# Patient Record
Sex: FEMALE | Race: WHITE | NOT HISPANIC OR LATINO | Employment: FULL TIME | URBAN - METROPOLITAN AREA
[De-identification: names, ages, dates, MRNs, and addresses within clinical notes are randomized per-mention and may not be internally consistent; named-entity substitution may affect disease eponyms.]

---

## 2023-05-01 PROBLEM — E66.9 OBESITY (BMI 30-39.9): Status: ACTIVE | Noted: 2023-05-01

## 2023-05-17 LAB — EXTERNAL GLUCOSE TOLERANCE TEST FASTING: 114 MG/DL

## 2023-07-24 LAB — EXTERNAL GROUP B STREP ANTIGEN: POSITIVE

## 2023-08-14 RX ORDER — NALOXONE HCL 0.4 MG/ML
0.4 VIAL (ML) INJECTION
Status: CANCELLED | OUTPATIENT
Start: 2023-08-14

## 2023-08-14 RX ORDER — MAGNESIUM CARB/ALUMINUM HYDROX 105-160MG
30 TABLET,CHEWABLE ORAL ONCE
Status: CANCELLED | OUTPATIENT
Start: 2023-08-15 | End: 2023-08-15

## 2023-08-14 RX ORDER — ONDANSETRON 4 MG/1
4 TABLET, FILM COATED ORAL EVERY 6 HOURS PRN
Status: CANCELLED | OUTPATIENT
Start: 2023-08-14

## 2023-08-14 RX ORDER — OXYTOCIN 10 [USP'U]/ML
10 INJECTION, SOLUTION INTRAMUSCULAR; INTRAVENOUS ONCE
Status: CANCELLED | OUTPATIENT
Start: 2023-08-15 | End: 2023-08-15

## 2023-08-14 RX ORDER — OXYCODONE HYDROCHLORIDE AND ACETAMINOPHEN 5; 325 MG/1; MG/1
1 TABLET ORAL EVERY 4 HOURS PRN
Status: CANCELLED | OUTPATIENT
Start: 2023-08-14 | End: 2023-08-24

## 2023-08-14 RX ORDER — MORPHINE SULFATE 2 MG/ML
1 INJECTION, SOLUTION INTRAMUSCULAR; INTRAVENOUS EVERY 4 HOURS PRN
Status: CANCELLED | OUTPATIENT
Start: 2023-08-14 | End: 2023-08-21

## 2023-08-14 RX ORDER — ONDANSETRON 2 MG/ML
4 INJECTION INTRAMUSCULAR; INTRAVENOUS EVERY 6 HOURS PRN
Status: CANCELLED | OUTPATIENT
Start: 2023-08-14

## 2023-08-14 RX ORDER — PENICILLIN G 3000000 [IU]/50ML
3 INJECTION, SOLUTION INTRAVENOUS EVERY 4 HOURS
Status: CANCELLED | OUTPATIENT
Start: 2023-08-15 | End: 2023-08-17

## 2023-08-14 RX ORDER — MISOPROSTOL 100 MCG
25 TABLET ORAL ONCE
Status: CANCELLED | OUTPATIENT
Start: 2023-08-15 | End: 2023-08-15

## 2023-08-14 RX ORDER — IBUPROFEN 600 MG/1
600 TABLET ORAL EVERY 6 HOURS PRN
Status: CANCELLED | OUTPATIENT
Start: 2023-08-14

## 2023-08-14 RX ORDER — CARBOPROST TROMETHAMINE 250 UG/ML
250 INJECTION, SOLUTION INTRAMUSCULAR AS NEEDED
Status: CANCELLED | OUTPATIENT
Start: 2023-08-14

## 2023-08-14 RX ORDER — METHYLERGONOVINE MALEATE 0.2 MG/ML
200 INJECTION INTRAVENOUS ONCE AS NEEDED
Status: CANCELLED | OUTPATIENT
Start: 2023-08-14

## 2023-08-14 RX ORDER — MORPHINE SULFATE 2 MG/ML
2 INJECTION, SOLUTION INTRAMUSCULAR; INTRAVENOUS EVERY 4 HOURS PRN
Status: CANCELLED | OUTPATIENT
Start: 2023-08-14 | End: 2023-08-21

## 2023-08-14 RX ORDER — ACETAMINOPHEN 325 MG/1
650 TABLET ORAL EVERY 4 HOURS PRN
Status: CANCELLED | OUTPATIENT
Start: 2023-08-14

## 2023-08-14 RX ORDER — OXYCODONE AND ACETAMINOPHEN 10; 325 MG/1; MG/1
2 TABLET ORAL EVERY 4 HOURS PRN
Status: CANCELLED | OUTPATIENT
Start: 2023-08-14 | End: 2023-08-24

## 2023-08-14 RX ORDER — TERBUTALINE SULFATE 1 MG/ML
0.25 INJECTION, SOLUTION SUBCUTANEOUS AS NEEDED
Status: CANCELLED | OUTPATIENT
Start: 2023-08-14

## 2023-08-14 RX ORDER — MISOPROSTOL 200 UG/1
800 TABLET ORAL AS NEEDED
Status: CANCELLED | OUTPATIENT
Start: 2023-08-14

## 2023-08-14 RX ORDER — SODIUM CHLORIDE, SODIUM LACTATE, POTASSIUM CHLORIDE, CALCIUM CHLORIDE 600; 310; 30; 20 MG/100ML; MG/100ML; MG/100ML; MG/100ML
125 INJECTION, SOLUTION INTRAVENOUS CONTINUOUS
Status: CANCELLED | OUTPATIENT
Start: 2023-08-15

## 2023-08-15 ENCOUNTER — HOSPITAL ENCOUNTER (INPATIENT)
Facility: HOSPITAL | Age: 38
LOS: 4 days | Discharge: HOME | End: 2023-08-19
Attending: OBSTETRICS & GYNECOLOGY | Admitting: OBSTETRICS & GYNECOLOGY
Payer: COMMERCIAL

## 2023-08-15 ENCOUNTER — HOSPITAL ENCOUNTER (OUTPATIENT)
Dept: LABOR AND DELIVERY | Facility: HOSPITAL | Age: 38
Discharge: HOME | End: 2023-08-15
Payer: COMMERCIAL

## 2023-08-15 DIAGNOSIS — Z98.891 STATUS POST PRIMARY LOW TRANSVERSE CESAREAN SECTION: Primary | ICD-10-CM

## 2023-08-15 PROBLEM — Z34.90 TERM PREGNANCY: Status: ACTIVE | Noted: 2023-08-15

## 2023-08-15 LAB
ABO GROUP BLD: NORMAL
ALP SERPL-CCNC: 217 U/L (ref 39–117)
ALT SERPL W P-5'-P-CCNC: 16 U/L (ref 1–33)
AST SERPL-CCNC: 27 U/L (ref 1–32)
BILIRUB SERPL-MCNC: 0.2 MG/DL (ref 0–1.2)
BLD GP AB SCN SERPL QL: NEGATIVE
CREAT SERPL-MCNC: 0.37 MG/DL (ref 0.57–1)
DEPRECATED RDW RBC AUTO: 48.9 FL (ref 37–54)
ERYTHROCYTE [DISTWIDTH] IN BLOOD BY AUTOMATED COUNT: 14.7 % (ref 12.3–15.4)
HCT VFR BLD AUTO: 33.3 % (ref 34–46.6)
HGB BLD-MCNC: 11.1 G/DL (ref 12–15.9)
LDH SERPL-CCNC: 249 U/L (ref 135–214)
MCH RBC QN AUTO: 30.3 PG (ref 26.6–33)
MCHC RBC AUTO-ENTMCNC: 33.3 G/DL (ref 31.5–35.7)
MCV RBC AUTO: 91 FL (ref 79–97)
PLATELET # BLD AUTO: 260 10*3/MM3 (ref 140–450)
PMV BLD AUTO: 10.1 FL (ref 6–12)
RBC # BLD AUTO: 3.66 10*6/MM3 (ref 3.77–5.28)
RH BLD: POSITIVE
T&S EXPIRATION DATE: NORMAL
URATE SERPL-MCNC: 3.9 MG/DL (ref 2.4–5.7)
WBC NRBC COR # BLD: 8.74 10*3/MM3 (ref 3.4–10.8)

## 2023-08-15 PROCEDURE — 84550 ASSAY OF BLOOD/URIC ACID: CPT | Performed by: OBSTETRICS & GYNECOLOGY

## 2023-08-15 PROCEDURE — 86900 BLOOD TYPING SEROLOGIC ABO: CPT | Performed by: OBSTETRICS & GYNECOLOGY

## 2023-08-15 PROCEDURE — 86901 BLOOD TYPING SEROLOGIC RH(D): CPT | Performed by: OBSTETRICS & GYNECOLOGY

## 2023-08-15 PROCEDURE — 85027 COMPLETE CBC AUTOMATED: CPT | Performed by: OBSTETRICS & GYNECOLOGY

## 2023-08-15 PROCEDURE — 84450 TRANSFERASE (AST) (SGOT): CPT | Performed by: OBSTETRICS & GYNECOLOGY

## 2023-08-15 PROCEDURE — 82565 ASSAY OF CREATININE: CPT | Performed by: OBSTETRICS & GYNECOLOGY

## 2023-08-15 PROCEDURE — 83615 LACTATE (LD) (LDH) ENZYME: CPT | Performed by: OBSTETRICS & GYNECOLOGY

## 2023-08-15 PROCEDURE — 84460 ALANINE AMINO (ALT) (SGPT): CPT | Performed by: OBSTETRICS & GYNECOLOGY

## 2023-08-15 PROCEDURE — 82247 BILIRUBIN TOTAL: CPT | Performed by: OBSTETRICS & GYNECOLOGY

## 2023-08-15 PROCEDURE — 86850 RBC ANTIBODY SCREEN: CPT | Performed by: OBSTETRICS & GYNECOLOGY

## 2023-08-15 PROCEDURE — 84075 ASSAY ALKALINE PHOSPHATASE: CPT | Performed by: OBSTETRICS & GYNECOLOGY

## 2023-08-15 PROCEDURE — 25010000002 MORPHINE PER 10 MG: Performed by: OBSTETRICS & GYNECOLOGY

## 2023-08-15 PROCEDURE — 25010000002 ONDANSETRON PER 1 MG: Performed by: OBSTETRICS & GYNECOLOGY

## 2023-08-15 PROCEDURE — 25010000002 PENICILLIN G POTASSIUM PER 600000 UNITS: Performed by: OBSTETRICS & GYNECOLOGY

## 2023-08-15 PROCEDURE — 59025 FETAL NON-STRESS TEST: CPT

## 2023-08-15 RX ORDER — MAGNESIUM CARB/ALUMINUM HYDROX 105-160MG
30 TABLET,CHEWABLE ORAL ONCE
Status: DISCONTINUED | OUTPATIENT
Start: 2023-08-15 | End: 2023-08-16

## 2023-08-15 RX ORDER — SODIUM CHLORIDE, SODIUM LACTATE, POTASSIUM CHLORIDE, CALCIUM CHLORIDE 600; 310; 30; 20 MG/100ML; MG/100ML; MG/100ML; MG/100ML
125 INJECTION, SOLUTION INTRAVENOUS CONTINUOUS
Status: DISCONTINUED | OUTPATIENT
Start: 2023-08-15 | End: 2023-08-16

## 2023-08-15 RX ORDER — ONDANSETRON 4 MG/1
4 TABLET, FILM COATED ORAL EVERY 6 HOURS PRN
Status: DISCONTINUED | OUTPATIENT
Start: 2023-08-15 | End: 2023-08-16 | Stop reason: SDUPTHER

## 2023-08-15 RX ORDER — NALOXONE HCL 0.4 MG/ML
0.4 VIAL (ML) INJECTION
Status: DISCONTINUED | OUTPATIENT
Start: 2023-08-15 | End: 2023-08-15

## 2023-08-15 RX ORDER — PENICILLIN G 3000000 [IU]/50ML
3 INJECTION, SOLUTION INTRAVENOUS EVERY 4 HOURS
Status: DISCONTINUED | OUTPATIENT
Start: 2023-08-15 | End: 2023-08-16

## 2023-08-15 RX ORDER — OXYTOCIN/0.9 % SODIUM CHLORIDE 30/500 ML
2-30 PLASTIC BAG, INJECTION (ML) INTRAVENOUS
Status: DISCONTINUED | OUTPATIENT
Start: 2023-08-15 | End: 2023-08-16

## 2023-08-15 RX ORDER — NALOXONE HCL 0.4 MG/ML
0.4 VIAL (ML) INJECTION
Status: DISCONTINUED | OUTPATIENT
Start: 2023-08-15 | End: 2023-08-16

## 2023-08-15 RX ORDER — MORPHINE SULFATE 2 MG/ML
2 INJECTION, SOLUTION INTRAMUSCULAR; INTRAVENOUS EVERY 4 HOURS PRN
Status: DISCONTINUED | OUTPATIENT
Start: 2023-08-15 | End: 2023-08-15

## 2023-08-15 RX ORDER — MORPHINE SULFATE 2 MG/ML
2 INJECTION, SOLUTION INTRAMUSCULAR; INTRAVENOUS
Status: DISCONTINUED | OUTPATIENT
Start: 2023-08-15 | End: 2023-08-16

## 2023-08-15 RX ORDER — NALOXONE HCL 0.4 MG/ML
0.4 VIAL (ML) INJECTION
Status: DISCONTINUED | OUTPATIENT
Start: 2023-08-15 | End: 2023-08-15 | Stop reason: SDUPTHER

## 2023-08-15 RX ORDER — FERROUS SULFATE 325(65) MG
325 TABLET ORAL
COMMUNITY

## 2023-08-15 RX ORDER — MORPHINE SULFATE 2 MG/ML
1 INJECTION, SOLUTION INTRAMUSCULAR; INTRAVENOUS EVERY 4 HOURS PRN
Status: DISCONTINUED | OUTPATIENT
Start: 2023-08-15 | End: 2023-08-15 | Stop reason: SDUPTHER

## 2023-08-15 RX ORDER — ONDANSETRON 2 MG/ML
4 INJECTION INTRAMUSCULAR; INTRAVENOUS EVERY 6 HOURS PRN
Status: DISCONTINUED | OUTPATIENT
Start: 2023-08-15 | End: 2023-08-16 | Stop reason: SDUPTHER

## 2023-08-15 RX ORDER — FAMOTIDINE 10 MG/ML
20 INJECTION, SOLUTION INTRAVENOUS EVERY 12 HOURS PRN
Status: DISCONTINUED | OUTPATIENT
Start: 2023-08-15 | End: 2023-08-16

## 2023-08-15 RX ORDER — TERBUTALINE SULFATE 1 MG/ML
0.25 INJECTION, SOLUTION SUBCUTANEOUS AS NEEDED
Status: DISCONTINUED | OUTPATIENT
Start: 2023-08-15 | End: 2023-08-16

## 2023-08-15 RX ORDER — MISOPROSTOL 100 MCG
25 TABLET ORAL ONCE
Status: COMPLETED | OUTPATIENT
Start: 2023-08-15 | End: 2023-08-15

## 2023-08-15 RX ORDER — MAGNESIUM CARB/ALUMINUM HYDROX 105-160MG
TABLET,CHEWABLE ORAL ONCE
COMMUNITY

## 2023-08-15 RX ADMIN — PENICILLIN G 3 MILLION UNITS: 3000000 INJECTION, SOLUTION INTRAVENOUS at 21:56

## 2023-08-15 RX ADMIN — PENICILLIN G 3 MILLION UNITS: 3000000 INJECTION, SOLUTION INTRAVENOUS at 16:48

## 2023-08-15 RX ADMIN — MORPHINE SULFATE 2 MG: 2 INJECTION, SOLUTION INTRAMUSCULAR; INTRAVENOUS at 13:23

## 2023-08-15 RX ADMIN — ONDANSETRON 4 MG: 2 INJECTION INTRAMUSCULAR; INTRAVENOUS at 10:17

## 2023-08-15 RX ADMIN — FAMOTIDINE 20 MG: 10 INJECTION INTRAVENOUS at 23:35

## 2023-08-15 RX ADMIN — Medication 2 MILLI-UNITS/MIN: at 16:05

## 2023-08-15 RX ADMIN — SODIUM CHLORIDE, POTASSIUM CHLORIDE, SODIUM LACTATE AND CALCIUM CHLORIDE 125 ML/HR: 600; 310; 30; 20 INJECTION, SOLUTION INTRAVENOUS at 16:44

## 2023-08-15 RX ADMIN — MORPHINE SULFATE 1 MG: 2 INJECTION, SOLUTION INTRAMUSCULAR; INTRAVENOUS at 11:01

## 2023-08-15 RX ADMIN — SODIUM CHLORIDE 5 MILLION UNITS: 900 INJECTION INTRAVENOUS at 08:20

## 2023-08-15 RX ADMIN — SODIUM CHLORIDE, POTASSIUM CHLORIDE, SODIUM LACTATE AND CALCIUM CHLORIDE 125 ML/HR: 600; 310; 30; 20 INJECTION, SOLUTION INTRAVENOUS at 23:28

## 2023-08-15 RX ADMIN — SODIUM CHLORIDE, POTASSIUM CHLORIDE, SODIUM LACTATE AND CALCIUM CHLORIDE 125 ML/HR: 600; 310; 30; 20 INJECTION, SOLUTION INTRAVENOUS at 09:59

## 2023-08-15 RX ADMIN — SODIUM CHLORIDE, POTASSIUM CHLORIDE, SODIUM LACTATE AND CALCIUM CHLORIDE 125 ML/HR: 600; 310; 30; 20 INJECTION, SOLUTION INTRAVENOUS at 07:12

## 2023-08-15 RX ADMIN — Medication 25 MCG: at 11:00

## 2023-08-15 RX ADMIN — PENICILLIN G 3 MILLION UNITS: 3000000 INJECTION, SOLUTION INTRAVENOUS at 12:46

## 2023-08-15 NOTE — H&P
CHAKA Mendez  Debra Palacios  : 1985  MRN: [id number]  CSN: [id number]    History and Physical for Induction of Labor    Subjective     Debra Palacios is a 38 y.o. year old  at 39w3d presenting for induction of labor for elective IOL.     Pregnancy complicated by the following:  Conceived via IVF- PGT on embryo. S/p fetal echo at 24wks  AMA with normal NIPT  PNL: Rh+/RI  obesity: BMI 40  Anxiety on zoloft  Borderline suspected fetal macrosomia.  US on  at 36 weeks: 7lb 5oz, 88th percentile, AC 98%ile    Fetal EFW is AGA and pelvis is clinically adequate.  Risks of labor induction including prolongation of labor, increased risks for both  section and operative vaginal birth have been discussed at length.     OB History    Para Term  AB Living   1 0 0 0 0 0   SAB IAB Ectopic Molar Multiple Live Births   0 0 0 0 0 0      # Outcome Date GA Lbr Elijah/2nd Weight Sex Delivery Anes PTL Lv   1 Current               Obstetric Comments   FOB #1, pregnancy #1, eggs frozen for 2 mos, no donors, embryo and NIPT wnl.      Past Medical History:   Diagnosis Date    Anxiety and depression     on zoloft, therapy as a teenager    Abnormal Pap smear of cervix     Elevated cholesterol     borderline    History of transfusion     20 years ago    Male infertility      product of in vitro fertilization (IVF) pregnancy      Past Surgical History:   Procedure Laterality Date    SHOULDER SURGERY Left 2017    KNEE SURGERY Right     surgery x3    WISDOM TOOTH EXTRACTION         Current Facility-Administered Medications:     lactated ringers bolus 1,000 mL, 1,000 mL, Intravenous, Once, Canavan, Allison, MD, Held at 08/15/23 0820    lactated ringers infusion, 125 mL/hr, Intravenous, Continuous, Canavan, Allison, MD, Last Rate: 125 mL/hr at 08/15/23 0712, 125 mL/hr at 08/15/23 0712    mineral oil liquid 30 mL, 30 mL, Topical, Once, Canavan, Allison, MD    miSOPROStol (CYTOTEC) split  "tablet 25 mcg, 25 mcg, Vaginal, Once, Canavan, Allison, MD    morphine injection 1 mg, 1 mg, Intravenous, Q4H PRN **AND** naloxone (NARCAN) injection 0.4 mg, 0.4 mg, Intravenous, Q5 Min PRN, Canavan, Allison, MD    morphine injection 2 mg, 2 mg, Intravenous, Q4H PRN **AND** naloxone (NARCAN) injection 0.4 mg, 0.4 mg, Intravenous, Q5 Min PRN, Canavan, Allison, MD    ondansetron (ZOFRAN) tablet 4 mg, 4 mg, Oral, Q6H PRN **OR** ondansetron (ZOFRAN) injection 4 mg, 4 mg, Intravenous, Q6H PRN, Canavan, Allison, MD    [COMPLETED] penicillin g 5 MU/100 mL 0.9% NS IVPB (mbp), 5 Million Units, Intravenous, Once, 5 Million Units at 08/15/23 0820 **FOLLOWED BY** penicillin G in iso-osmotic dextrose IVPB 3 million units (premix), 3 Million Units, Intravenous, Q4H, Canavan, Allison, MD    terbutaline (BRETHINE) injection 0.25 mg, 0.25 mg, Subcutaneous, PRN, Canavan, Allison, MD  No Known Allergies  Social History    Tobacco Use      Smoking status: Former        Packs/day: 0.50        Years: 10.00        Pack years: 5        Types: Cigarettes        Quit date:         Years since quittin.6      Smokeless tobacco: Not on file    Review of Systems    A 12 point review of systems was negative with the exception of those listed in the HPI.    /64 (BP Location: Right arm, Patient Position: Lying)   Pulse 94   Temp 97.8 øF (36.6 øC) (Oral)   Resp 16   Ht 175.3 cm (69\")   Wt 125 kg (276 lb)   Breastfeeding Yes   BMI 40.76 kg/mý     General: well developed; well nourished, no acute distress   Abdomen: soft, non-tender; fundus firm and non-tender, gravid   Cervix: Cervical Exam   Last exam  Dil Eff Sta Exam Time   1 20% -3 0856      Presentation: Vertex by Leopolds    Velez balloon placed without difficulty by palpation, chaperone in room RN.  60 mL placed in balloon.       Prenatal Labs  Lab Results   Component Value Date    HGB 11.1 (L) 08/15/2023    RUBELLAABIGG 1.89 2023    HEPBSAG Non-Reactive 2023 "    ABSCRN Negative 08/15/2023    GHZ1YTK0 Non-Reactive 2023     2023    URINECX No growth 2023       Lab Results   Component Value Date    WBC 8.74 08/15/2023    HGB 11.1 (L) 08/15/2023    HCT 33.3 (L) 08/15/2023    MCV 91.0 08/15/2023     08/15/2023     Lab Results   Component Value Date    CREATININE 0.37 (L) 08/15/2023    AST 27 08/15/2023    ALT 16 08/15/2023              Assessment and Plan:  IUP at  at 39w3d presenting for IOL due to elective IOL.   Counseled regarding the r/b/a of induction, possible increased risk of postpartum hemorrhage,  delivery, operative vaginal birth.  Patient counseled, voices understanding, prefers induction with attempted vaginal delivery over  delivery.  Fetal well being - overall reassuring.  CEFM in labor.   Induction: Given cervical exam, plan for induction with miso and FB, then pitocin per protocol.   GBS negative.   Vertex by Leopolds, verify by ultrasound on L&D.   Epidural prn.   Maternal considerations:   Anxiety on zoloft, continue home dose   Obesity. SCD's postpartum             Allison H. Canavan, MD, FACOG  Obstetrics and Gynecology  [facility]  Office: [phone]

## 2023-08-16 ENCOUNTER — ANESTHESIA EVENT (OUTPATIENT)
Dept: LABOR AND DELIVERY | Facility: HOSPITAL | Age: 38
End: 2023-08-16
Payer: COMMERCIAL

## 2023-08-16 ENCOUNTER — ANESTHESIA (OUTPATIENT)
Dept: LABOR AND DELIVERY | Facility: HOSPITAL | Age: 38
End: 2023-08-16
Payer: COMMERCIAL

## 2023-08-16 PROCEDURE — 25010000002 CEFAZOLIN PER 500 MG: Performed by: ADVANCED PRACTICE MIDWIFE

## 2023-08-16 PROCEDURE — 25010000002 MIDAZOLAM PER 1 MG: Performed by: ANESTHESIOLOGY

## 2023-08-16 PROCEDURE — C1755 CATHETER, INTRASPINAL: HCPCS

## 2023-08-16 PROCEDURE — 0 MORPHINE PER 10 MG: Performed by: ANESTHESIOLOGY

## 2023-08-16 PROCEDURE — 25010000002 OXYTOCIN PER 10 UNITS: Performed by: ANESTHESIOLOGY

## 2023-08-16 PROCEDURE — C1755 CATHETER, INTRASPINAL: HCPCS | Performed by: ANESTHESIOLOGY

## 2023-08-16 PROCEDURE — 25010000002 ROPIVACAINE PER 1 MG: Performed by: ANESTHESIOLOGY

## 2023-08-16 PROCEDURE — 25010000002 FENTANYL CITRATE (PF) 100 MCG/2ML SOLUTION: Performed by: ANESTHESIOLOGY

## 2023-08-16 PROCEDURE — 25010000002 METOCLOPRAMIDE PER 10 MG: Performed by: ANESTHESIOLOGY

## 2023-08-16 PROCEDURE — 25010000002 AZITHROMYCIN PER 500 MG: Performed by: ADVANCED PRACTICE MIDWIFE

## 2023-08-16 PROCEDURE — 25010000002 METHYLERGONOVINE MALEATE PER 0.2 MG: Performed by: ANESTHESIOLOGY

## 2023-08-16 PROCEDURE — 25010000002 BUPIVACAINE (PF) 0.5 % SOLUTION: Performed by: ANESTHESIOLOGY

## 2023-08-16 PROCEDURE — 25010000002 FENTANYL CITRATE (PF) 50 MCG/ML SOLUTION: Performed by: ANESTHESIOLOGY

## 2023-08-16 PROCEDURE — 25010000002 ONDANSETRON PER 1 MG: Performed by: ANESTHESIOLOGY

## 2023-08-16 PROCEDURE — 59025 FETAL NON-STRESS TEST: CPT

## 2023-08-16 PROCEDURE — 25010000002 PENICILLIN G POTASSIUM PER 600000 UNITS: Performed by: OBSTETRICS & GYNECOLOGY

## 2023-08-16 PROCEDURE — 25010000002 ONDANSETRON PER 1 MG: Performed by: OBSTETRICS & GYNECOLOGY

## 2023-08-16 PROCEDURE — 10907ZC DRAINAGE OF AMNIOTIC FLUID, THERAPEUTIC FROM PRODUCTS OF CONCEPTION, VIA NATURAL OR ARTIFICIAL OPENING: ICD-10-PCS

## 2023-08-16 PROCEDURE — 51702 INSERT TEMP BLADDER CATH: CPT

## 2023-08-16 RX ORDER — OXYTOCIN 10 [USP'U]/ML
INJECTION, SOLUTION INTRAMUSCULAR; INTRAVENOUS AS NEEDED
Status: DISCONTINUED | OUTPATIENT
Start: 2023-08-16 | End: 2023-08-16 | Stop reason: SURG

## 2023-08-16 RX ORDER — METOCLOPRAMIDE HYDROCHLORIDE 5 MG/ML
INJECTION INTRAMUSCULAR; INTRAVENOUS AS NEEDED
Status: DISCONTINUED | OUTPATIENT
Start: 2023-08-16 | End: 2023-08-16 | Stop reason: SURG

## 2023-08-16 RX ORDER — FENTANYL CITRATE 50 UG/ML
INJECTION, SOLUTION INTRAMUSCULAR; INTRAVENOUS AS NEEDED
Status: DISCONTINUED | OUTPATIENT
Start: 2023-08-16 | End: 2023-08-16 | Stop reason: SURG

## 2023-08-16 RX ORDER — ONDANSETRON 2 MG/ML
4 INJECTION INTRAMUSCULAR; INTRAVENOUS EVERY 6 HOURS PRN
Status: DISCONTINUED | OUTPATIENT
Start: 2023-08-16 | End: 2023-08-17 | Stop reason: HOSPADM

## 2023-08-16 RX ORDER — SODIUM CHLORIDE, SODIUM LACTATE, POTASSIUM CHLORIDE, CALCIUM CHLORIDE 600; 310; 30; 20 MG/100ML; MG/100ML; MG/100ML; MG/100ML
INJECTION, SOLUTION INTRAVENOUS CONTINUOUS PRN
Status: DISCONTINUED | OUTPATIENT
Start: 2023-08-16 | End: 2023-08-16 | Stop reason: SURG

## 2023-08-16 RX ORDER — ACETAMINOPHEN 500 MG
1000 TABLET ORAL ONCE
Status: DISCONTINUED | OUTPATIENT
Start: 2023-08-16 | End: 2023-08-17 | Stop reason: HOSPADM

## 2023-08-16 RX ORDER — METHYLERGONOVINE MALEATE 0.2 MG/ML
INJECTION INTRAVENOUS AS NEEDED
Status: DISCONTINUED | OUTPATIENT
Start: 2023-08-16 | End: 2023-08-16 | Stop reason: SURG

## 2023-08-16 RX ORDER — HYDROMORPHONE HYDROCHLORIDE 1 MG/ML
0.5 INJECTION, SOLUTION INTRAMUSCULAR; INTRAVENOUS; SUBCUTANEOUS
Status: DISCONTINUED | OUTPATIENT
Start: 2023-08-16 | End: 2023-08-17 | Stop reason: HOSPADM

## 2023-08-16 RX ORDER — FAMOTIDINE 10 MG/ML
INJECTION, SOLUTION INTRAVENOUS AS NEEDED
Status: DISCONTINUED | OUTPATIENT
Start: 2023-08-16 | End: 2023-08-16 | Stop reason: SURG

## 2023-08-16 RX ORDER — METOCLOPRAMIDE HYDROCHLORIDE 5 MG/ML
10 INJECTION INTRAMUSCULAR; INTRAVENOUS ONCE AS NEEDED
Status: DISCONTINUED | OUTPATIENT
Start: 2023-08-16 | End: 2023-08-17 | Stop reason: HOSPADM

## 2023-08-16 RX ORDER — ONDANSETRON 2 MG/ML
4 INJECTION INTRAMUSCULAR; INTRAVENOUS ONCE AS NEEDED
Status: DISCONTINUED | OUTPATIENT
Start: 2023-08-16 | End: 2023-08-16

## 2023-08-16 RX ORDER — OXYTOCIN/0.9 % SODIUM CHLORIDE 30/500 ML
PLASTIC BAG, INJECTION (ML) INTRAVENOUS AS NEEDED
Status: DISCONTINUED | OUTPATIENT
Start: 2023-08-16 | End: 2023-08-16 | Stop reason: SURG

## 2023-08-16 RX ORDER — MORPHINE SULFATE 0.5 MG/ML
INJECTION, SOLUTION EPIDURAL; INTRATHECAL; INTRAVENOUS AS NEEDED
Status: DISCONTINUED | OUTPATIENT
Start: 2023-08-16 | End: 2023-08-16 | Stop reason: SURG

## 2023-08-16 RX ORDER — OXYTOCIN/0.9 % SODIUM CHLORIDE 30/500 ML
999 PLASTIC BAG, INJECTION (ML) INTRAVENOUS ONCE
Status: DISCONTINUED | OUTPATIENT
Start: 2023-08-16 | End: 2023-08-17 | Stop reason: HOSPADM

## 2023-08-16 RX ORDER — ACETAMINOPHEN 500 MG
1000 TABLET ORAL EVERY 6 HOURS PRN
Status: DISCONTINUED | OUTPATIENT
Start: 2023-08-16 | End: 2023-08-17 | Stop reason: SDUPTHER

## 2023-08-16 RX ORDER — DIPHENHYDRAMINE HYDROCHLORIDE 50 MG/ML
12.5 INJECTION INTRAMUSCULAR; INTRAVENOUS EVERY 8 HOURS PRN
Status: DISCONTINUED | OUTPATIENT
Start: 2023-08-16 | End: 2023-08-16

## 2023-08-16 RX ORDER — FAMOTIDINE 10 MG/ML
20 INJECTION, SOLUTION INTRAVENOUS ONCE AS NEEDED
Status: DISCONTINUED | OUTPATIENT
Start: 2023-08-16 | End: 2023-08-16

## 2023-08-16 RX ORDER — OXYTOCIN/0.9 % SODIUM CHLORIDE 30/500 ML
250 PLASTIC BAG, INJECTION (ML) INTRAVENOUS CONTINUOUS
Status: ACTIVE | OUTPATIENT
Start: 2023-08-17 | End: 2023-08-17

## 2023-08-16 RX ORDER — EPHEDRINE SULFATE 5 MG/ML
10 INJECTION INTRAVENOUS
Status: DISCONTINUED | OUTPATIENT
Start: 2023-08-16 | End: 2023-08-16

## 2023-08-16 RX ORDER — ONDANSETRON 4 MG/1
4 TABLET, FILM COATED ORAL EVERY 6 HOURS PRN
Status: DISCONTINUED | OUTPATIENT
Start: 2023-08-16 | End: 2023-08-17 | Stop reason: HOSPADM

## 2023-08-16 RX ORDER — NALBUPHINE HYDROCHLORIDE 10 MG/ML
3 INJECTION, SOLUTION INTRAMUSCULAR; INTRAVENOUS; SUBCUTANEOUS ONCE AS NEEDED
Status: DISCONTINUED | OUTPATIENT
Start: 2023-08-16 | End: 2023-08-17 | Stop reason: HOSPADM

## 2023-08-16 RX ORDER — CITRIC ACID/SODIUM CITRATE 334-500MG
30 SOLUTION, ORAL ORAL ONCE
Status: DISCONTINUED | OUTPATIENT
Start: 2023-08-16 | End: 2023-08-16

## 2023-08-16 RX ORDER — ACETAMINOPHEN 325 MG/1
650 TABLET ORAL EVERY 6 HOURS PRN
Status: DISCONTINUED | OUTPATIENT
Start: 2023-08-16 | End: 2023-08-16

## 2023-08-16 RX ORDER — ROPIVACAINE HYDROCHLORIDE 2 MG/ML
15 INJECTION, SOLUTION EPIDURAL; INFILTRATION; PERINEURAL CONTINUOUS
Status: DISCONTINUED | OUTPATIENT
Start: 2023-08-16 | End: 2023-08-16

## 2023-08-16 RX ORDER — MIDAZOLAM HYDROCHLORIDE 1 MG/ML
INJECTION INTRAMUSCULAR; INTRAVENOUS AS NEEDED
Status: DISCONTINUED | OUTPATIENT
Start: 2023-08-16 | End: 2023-08-16 | Stop reason: SURG

## 2023-08-16 RX ORDER — ONDANSETRON 2 MG/ML
INJECTION INTRAMUSCULAR; INTRAVENOUS AS NEEDED
Status: DISCONTINUED | OUTPATIENT
Start: 2023-08-16 | End: 2023-08-16 | Stop reason: SURG

## 2023-08-16 RX ORDER — LIDOCAINE HYDROCHLORIDE AND EPINEPHRINE 15; 5 MG/ML; UG/ML
INJECTION, SOLUTION EPIDURAL AS NEEDED
Status: DISCONTINUED | OUTPATIENT
Start: 2023-08-16 | End: 2023-08-16 | Stop reason: SURG

## 2023-08-16 RX ORDER — EPHEDRINE SULFATE 5 MG/ML
INJECTION INTRAVENOUS
Status: DISCONTINUED
Start: 2023-08-16 | End: 2023-08-19 | Stop reason: HOSPADM

## 2023-08-16 RX ORDER — KETOROLAC TROMETHAMINE 30 MG/ML
30 INJECTION, SOLUTION INTRAMUSCULAR; INTRAVENOUS ONCE
Status: COMPLETED | OUTPATIENT
Start: 2023-08-16 | End: 2023-08-17

## 2023-08-16 RX ORDER — BUPIVACAINE HYDROCHLORIDE 5 MG/ML
INJECTION, SOLUTION EPIDURAL; INTRACAUDAL AS NEEDED
Status: DISCONTINUED | OUTPATIENT
Start: 2023-08-16 | End: 2023-08-16 | Stop reason: SURG

## 2023-08-16 RX ORDER — BUPIVACAINE HCL/0.9 % NACL/PF 0.125 %
PLASTIC BAG, INJECTION (ML) EPIDURAL AS NEEDED
Status: DISCONTINUED | OUTPATIENT
Start: 2023-08-16 | End: 2023-08-16 | Stop reason: SURG

## 2023-08-16 RX ORDER — CEFAZOLIN SODIUM IN 0.9 % NACL 3 G/100 ML
3 INTRAVENOUS SOLUTION, PIGGYBACK (ML) INTRAVENOUS ONCE
Status: COMPLETED | OUTPATIENT
Start: 2023-08-16 | End: 2023-08-16

## 2023-08-16 RX ADMIN — ONDANSETRON 4 MG: 2 INJECTION INTRAMUSCULAR; INTRAVENOUS at 06:25

## 2023-08-16 RX ADMIN — ROPIVACAINE HYDROCHLORIDE 15 ML/HR: 2 INJECTION, SOLUTION EPIDURAL; INFILTRATION at 18:32

## 2023-08-16 RX ADMIN — PENICILLIN G 3 MILLION UNITS: 3000000 INJECTION, SOLUTION INTRAVENOUS at 13:32

## 2023-08-16 RX ADMIN — SODIUM CHLORIDE, POTASSIUM CHLORIDE, SODIUM LACTATE AND CALCIUM CHLORIDE: 600; 310; 30; 20 INJECTION, SOLUTION INTRAVENOUS at 21:46

## 2023-08-16 RX ADMIN — Medication 200 MCG: at 22:14

## 2023-08-16 RX ADMIN — LIDOCAINE HYDROCHLORIDE AND EPINEPHRINE 7 ML: 20; 5 INJECTION, SOLUTION EPIDURAL; INFILTRATION; INTRACAUDAL; PERINEURAL at 21:50

## 2023-08-16 RX ADMIN — CEFAZOLIN 3 G: 10 INJECTION, POWDER, FOR SOLUTION INTRAVENOUS at 21:42

## 2023-08-16 RX ADMIN — METOCLOPRAMIDE 10 MG: 5 INJECTION, SOLUTION INTRAMUSCULAR; INTRAVENOUS at 21:50

## 2023-08-16 RX ADMIN — ACETAMINOPHEN 1000 MG: 500 TABLET ORAL at 21:15

## 2023-08-16 RX ADMIN — Medication 200 MCG: at 22:22

## 2023-08-16 RX ADMIN — MORPHINE SULFATE 2 MG: 0.5 INJECTION, SOLUTION EPIDURAL; INTRATHECAL; INTRAVENOUS at 22:09

## 2023-08-16 RX ADMIN — SODIUM CHLORIDE, POTASSIUM CHLORIDE, SODIUM LACTATE AND CALCIUM CHLORIDE 999 ML/HR: 600; 310; 30; 20 INJECTION, SOLUTION INTRAVENOUS at 05:02

## 2023-08-16 RX ADMIN — ONDANSETRON 4 MG: 2 INJECTION INTRAMUSCULAR; INTRAVENOUS at 21:50

## 2023-08-16 RX ADMIN — PENICILLIN G 3 MILLION UNITS: 3000000 INJECTION, SOLUTION INTRAVENOUS at 01:21

## 2023-08-16 RX ADMIN — SODIUM CHLORIDE, POTASSIUM CHLORIDE, SODIUM LACTATE AND CALCIUM CHLORIDE 999 ML/HR: 600; 310; 30; 20 INJECTION, SOLUTION INTRAVENOUS at 21:15

## 2023-08-16 RX ADMIN — LIDOCAINE HYDROCHLORIDE AND EPINEPHRINE 11 ML: 20; 5 INJECTION, SOLUTION EPIDURAL; INFILTRATION; INTRACAUDAL; PERINEURAL at 21:35

## 2023-08-16 RX ADMIN — METHYLERGONOVINE MALEATE 200 MCG: 0.2 INJECTION, SOLUTION INTRAMUSCULAR; INTRAVENOUS at 22:08

## 2023-08-16 RX ADMIN — SODIUM CITRATE AND CITRIC ACID MONOHYDRATE 30 ML: 500; 334 SOLUTION ORAL at 21:42

## 2023-08-16 RX ADMIN — SODIUM CHLORIDE, POTASSIUM CHLORIDE, SODIUM LACTATE AND CALCIUM CHLORIDE 999 ML/HR: 600; 310; 30; 20 INJECTION, SOLUTION INTRAVENOUS at 04:23

## 2023-08-16 RX ADMIN — Medication 500 ML: at 22:07

## 2023-08-16 RX ADMIN — Medication 200 MCG: at 22:18

## 2023-08-16 RX ADMIN — BUPIVACAINE HYDROCHLORIDE 7 ML: 5 INJECTION, SOLUTION EPIDURAL; INTRACAUDAL at 04:42

## 2023-08-16 RX ADMIN — SODIUM BICARBONATE 1 ML: 84 INJECTION INTRAVENOUS at 21:35

## 2023-08-16 RX ADMIN — OXYTOCIN 3 UNITS: 10 INJECTION INTRAVENOUS at 22:09

## 2023-08-16 RX ADMIN — EPHEDRINE SULFATE 10 MG: 5 INJECTION INTRAVENOUS at 06:50

## 2023-08-16 RX ADMIN — ACETAMINOPHEN 650 MG: 325 TABLET ORAL at 15:16

## 2023-08-16 RX ADMIN — FAMOTIDINE 20 MG: 10 INJECTION INTRAVENOUS at 14:25

## 2023-08-16 RX ADMIN — PENICILLIN G 3 MILLION UNITS: 3000000 INJECTION, SOLUTION INTRAVENOUS at 05:18

## 2023-08-16 RX ADMIN — FAMOTIDINE 20 MG: 10 INJECTION INTRAVENOUS at 21:50

## 2023-08-16 RX ADMIN — MIDAZOLAM HYDROCHLORIDE 1 MG: 2 INJECTION, SOLUTION INTRAMUSCULAR; INTRAVENOUS at 21:49

## 2023-08-16 RX ADMIN — MORPHINE SULFATE 3 MG: 0.5 INJECTION, SOLUTION EPIDURAL; INTRATHECAL; INTRAVENOUS at 22:03

## 2023-08-16 RX ADMIN — MIDAZOLAM HYDROCHLORIDE 1 MG: 2 INJECTION, SOLUTION INTRAMUSCULAR; INTRAVENOUS at 22:13

## 2023-08-16 RX ADMIN — SODIUM CHLORIDE, POTASSIUM CHLORIDE, SODIUM LACTATE AND CALCIUM CHLORIDE 125 ML/HR: 600; 310; 30; 20 INJECTION, SOLUTION INTRAVENOUS at 09:51

## 2023-08-16 RX ADMIN — Medication 200 MCG: at 22:10

## 2023-08-16 RX ADMIN — EPHEDRINE SULFATE 10 MG: 5 INJECTION INTRAVENOUS at 06:23

## 2023-08-16 RX ADMIN — OXYTOCIN 4 UNITS: 10 INJECTION INTRAVENOUS at 22:03

## 2023-08-16 RX ADMIN — AZITHROMYCIN 500 MG: 500 INJECTION, POWDER, LYOPHILIZED, FOR SOLUTION INTRAVENOUS at 21:42

## 2023-08-16 RX ADMIN — PENICILLIN G 3 MILLION UNITS: 3000000 INJECTION, SOLUTION INTRAVENOUS at 08:56

## 2023-08-16 RX ADMIN — PENICILLIN G 3 MILLION UNITS: 3000000 INJECTION, SOLUTION INTRAVENOUS at 17:33

## 2023-08-16 RX ADMIN — OXYTOCIN 3 UNITS: 10 INJECTION INTRAVENOUS at 22:06

## 2023-08-16 RX ADMIN — FENTANYL CITRATE 100 MCG: 50 INJECTION, SOLUTION INTRAMUSCULAR; INTRAVENOUS at 22:01

## 2023-08-16 RX ADMIN — EPHEDRINE SULFATE 10 MG: 5 INJECTION INTRAVENOUS at 05:53

## 2023-08-16 RX ADMIN — LIDOCAINE HYDROCHLORIDE AND EPINEPHRINE 3 ML: 15; 5 INJECTION, SOLUTION EPIDURAL at 04:42

## 2023-08-16 NOTE — PROGRESS NOTES
Labor note.  Late note.  Cervical check at 1700    S: Patient resting in bed, pain minimum to moderate.  Denies significant discomfort.  No complaints.  Planning on epidural eventually.      O:   Vitals:    01/28/23 0154 01/28/23 0530 01/28/23 0709 01/28/23 0839   BP: 129/66 131/81 142/75    BP Location: Left arm Left arm     Patient Position: Sitting Lying     Pulse: 88 83 85    Resp: 16 16 18 18   Temp: 97.8 øF (36.6 øC) 97.8 øF (36.6 øC) 98.1 øF (36.7 øC)    TempSrc: Oral Oral Oral      Gen: NAD, mood pleasant  Pulm: Nonlabored respirations  Abd: Gravid  Ext: Trace bilateral lower extremity edema  SVE:   Cervical Exam   Last 2 exams  Dil Eff Sta Exam Time   3 50% -3 1700   1 20% -3 0856       FHT: Cat I, overall reassuring  [place]: irritable  Pit at 2 mu/min  Velez balloon removed without difficulty    Lab Results   Component Value Date    WBC 8.74 08/15/2023    HGB 11.1 (L) 08/15/2023    HCT 33.3 (L) 08/15/2023    MCV 91.0 08/15/2023     08/15/2023     A    No results found for: GLUCOSE, BUN, CREATININE, EGFRIFNONA, EGFRIFAFRI, BCR, K, CO2, CALCIUM, PROTENTOTREF, ALBUMIN, LABIL2, BILIRUBIN, AST, ALT  Lab Results   Component Value Date    URICACID 3.9 08/15/2023       A/P:   FWB-r.  Category 1 fetal heart rate tracing, overall reassuring.    Admitted for induction of labor for elective IOL.   Induction course: Velez balloon placed at 0856, miso x1 at 1100 (late due to RN patient care elsewhere).  Pitocin per protocol.  Rh+, GBS negative  Epidural prn    Allison H. Canavan, MD, FACOG  Obstetrics and Gynecology  [facility]  Office: [phone]

## 2023-08-16 NOTE — ANESTHESIA PREPROCEDURE EVALUATION
Anesthesia Evaluation     Patient summary reviewed and Nursing notes reviewed                Airway   Mallampati: I  TM distance: >3 FB  Neck ROM: full  No difficulty expected  Dental - normal exam     Pulmonary - negative pulmonary ROS and normal exam   Cardiovascular - negative cardio ROS and normal exam        Neuro/Psych- negative ROS  GI/Hepatic/Renal/Endo    (+) obesity, morbid obesity    Musculoskeletal (-) negative ROS    Abdominal   (+) obese    Bowel sounds: normal.   Substance History - negative use     OB/GYN    (+) Pregnant        Other                      Anesthesia Plan    ASA 2 - emergent     epidural       Anesthetic plan, risks, benefits, and alternatives have been provided, discussed and informed consent has been obtained with: patient.    Use of blood products discussed with patient .    Plan discussed with attending.    CODE STATUS:

## 2023-08-16 NOTE — PROGRESS NOTES
Labor note.    S: Patient resting in bed, pain minimum to moderate.  Denies significant discomfort.  No complaints.  States she got some rest overnight after epidural.  In good spirits.  Declining  delivery this morning.      O:   Vitals:    23 0154 23 0530 23 0709 23 0839   BP: 129/66 131/81 142/75    BP Location: Left arm Left arm     Patient Position: Sitting Lying     Pulse: 88 83 85    Resp: 16 16 18 18   Temp: 97.8 øF (36.6 øC) 97.8 øF (36.6 øC) 98.1 øF (36.7 øC)    TempSrc: Oral Oral Oral      Gen: NAD, mood pleasant  Pulm: Nonlabored respirations  Abd: Gravid  Ext: Trace bilateral lower extremity edema  SVE: Cervical Exam   Last 3 exams  Dil Eff Sta Exam Time   3.5 90% -2 1104   4 70% -2 0504   3.5 50% -2 0336       FHT: Cat I, overall reassuring, min - mod variability, + accels  [place]: q 2 min   Pit at 20 mu/min    Lab Results   Component Value Date    WBC 8.74 08/15/2023    HGB 11.1 (L) 08/15/2023    HCT 33.3 (L) 08/15/2023    MCV 91.0 08/15/2023     08/15/2023     A positive     Lab Results   Component Value Date    CREATININE 0.37 (L) 08/15/2023    BILITOT 0.2 08/15/2023    AST 27 08/15/2023    ALT 16 08/15/2023     Lab Results   Component Value Date    TSH 1.490 2021     Pain Management Panel          Latest Ref Rng & Units 2023   Pain Management Panel   Amphetamine, Urine Qual Negative Negative    Barbiturates Screen, Urine Negative Negative    Benzodiazepine Screen, Urine Negative Negative    Buprenorphine, Screen, Urine Negative Negative    Cocaine Screen, Urine Negative Negative    Methadone Screen , Urine Negative Negative    Methamphetamine, Ur Negative Negative       Lab Results   Component Value Date    URICACID 3.9 08/15/2023       A/P:   FWB-r.  Category 1 fetal heart rate tracing, overall reassuring.    Admitted for induction of labor for elective IOL.  Induction course: miso x1, Velez balloon.  Pitocin per protocol.  IUPC and FSE placed  overnight.  Contractions inadequate. Will continue to increase pit per protocol, okay to increase to  30 mu/min.  No change since 1700 yesterday evening based on my examination.  Offered  delivery, declines.    Rh+, GBS positive on penicillin  Epidural in situ     Allison H. Canavan, MD, FACOG  Obstetrics and Gynecology  formerly [facility]  Office: [phone]

## 2023-08-16 NOTE — ANESTHESIA PROCEDURE NOTES
Labor Epidural      Patient reassessed immediately prior to procedure    Patient location during procedure: OB  Start Time: 8/16/2023 4:32 AM  Stop Time: 8/16/2023 4:49 AM  Performed By  Anesthesiologist: Jos Christianson MD  Preanesthetic Checklist  Completed: patient identified, IV checked, site marked, risks and benefits discussed, surgical consent, monitors and equipment checked, pre-op evaluation and timeout performed  Prep:  Pt Position:sitting  Sterile Tech:cap, gloves, mask and sterile barrier  Prep:DuraPrep  Monitoring:blood pressure monitoring  Epidural Block Procedure:  Approach:midline  Guidance:landmark technique  Location:L3-L4  Needle Type:Tuohy  Needle Gauge:17 G  Loss of Resistance Medium: air  Loss of Resistance: 5cm  Cath Depth at skin:15 cm  Paresthesia: none  Aspiration:negative  Test Dose:negative  Number of Attempts: 1  Post Assessment:  Dressing:occlusive dressing applied and secured with tape  Pt Tolerance:patient tolerated the procedure well with no apparent complications  Complications:no

## 2023-08-16 NOTE — PROGRESS NOTES
"08/16/23  07:45 EDT  Debra Palacios      ASSESSMENTS:    /58   Pulse 97   Temp 97.5 øF (36.4 øC) (Oral)   Resp 18   Ht 175.3 cm (69\")   Wt 125 kg (276 lb)   Breastfeeding Yes   BMI 40.76 kg/mý     Fetal Heart Rate Assessment   Method: Fetal HR Assessment Method: fetal spiral electrode   Baseline: Fetal HR Baseline: normal range   Varibility: Fetal HR Variability: moderate (amplitude range 6 to 25 bpm)   Accels: Fetal HR Accelerations: greater than/equal to 15 bpm, lasting at least 15 seconds   Decels: Fetal HR Decelerations: absent     Uterine Assessment   Method: Method: IUPC (intrauterine pressure catheter)   Frequency (min): Contraction Frequency (Minutes): 2-3   Intensity: Contraction Intensity: mild by palpation   Intensity by IUPC: Contraction Intensity (mmHg) by IUPC: 45-50   Resting Tone: Uterine Resting Tone: soft by palpation   Resting Tone by IUPC: Uterine Resting Tone (mmHg) by IUPC: 25-30   [place] Units:         Presentation: Vertex   Cervix: Exam by: Method: sterile exam per RN   Dilation: Cervical Dilation (cm): 4   Effacement: Cervical Effacement: 70%   Station: Fetal Station: -2            Lab Results   Component Value Date    WBC 8.74 08/15/2023    HGB 11.1 (L) 08/15/2023    HCT 33.3 (L) 08/15/2023    MCV 91.0 08/15/2023     08/15/2023    URICACID 3.9 08/15/2023    AST 27 08/15/2023    ALT 16 08/15/2023     (H) 08/15/2023     Results from last 7 days   Lab Units 08/15/23  0709   ABO TYPING  A   RH TYPING  Positive   ANTIBODY SCREEN  Negative         PLAN:  Mother: patient has neuraxial anesthesia that is providing adequate relief at this time., is on bedrest due to neuraxial analgesia  Fetus: fetal heart rate status is reassuring at this time continue fetal monitoring by continuous external fetal monitor  Labor: Continue with induction. AROM at 03:04 on 08/16/23 with clear fluid.   Increase pitocin further get adequate MVU's.   GBS Result: The GBS result was " positive. Continue with PCN dosing Q 4 hours.            Rosy Kay CNM  07:45 EDT  08/16/23

## 2023-08-16 NOTE — PROGRESS NOTES
Laborist intrapartum note    --Patient status post SROM.  Received request to proceed with placement of FSE and IUPC due to difficulty with monitoring based on patient's habitus.    --Cervical exam: 3 cm / 50%/-2 to -3 station.  IUPC placed to 45 cm.  No difficulty encountered.  FSE also placed.

## 2023-08-16 NOTE — PROGRESS NOTES
Labor note.    S: Patient resting in bed, pain well controlled with epidural.  Denies significant discomfort.  No complaints.    O:   Vitals:    01/28/23 0154 01/28/23 0530 01/28/23 0709 01/28/23 0839   BP: 129/66 131/81 142/75    BP Location: Left arm Left arm     Patient Position: Sitting Lying     Pulse: 88 83 85    Resp: 16 16 18 18   Temp: 97.8 øF (36.6 øC) 97.8 øF (36.6 øC) 98.1 øF (36.7 øC)    TempSrc: Oral Oral Oral      Gen: NAD, mood pleasant  Pulm: Nonlabored respirations  Abd: Gravid  Ext: Trace bilateral lower extremity edema  SVE: Cervical Exam   Last 3 exams  Dil Eff Sta Exam Time   4.5 90% -2 1604   3.5 90% -2 1104   4 70% -2 0504       FHT: Cat I, overall reassuring  [place]: q 8 min    Lab Results   Component Value Date    WBC 8.74 08/15/2023    HGB 11.1 (L) 08/15/2023    HCT 33.3 (L) 08/15/2023    MCV 91.0 08/15/2023     08/15/2023     A pos    No results found for: GLUCOSE, BUN, CREATININE, EGFRIFNONA, EGFRIFAFRI, BCR, K, CO2, CALCIUM, PROTENTOTREF, ALBUMIN, LABIL2, BILIRUBIN, AST, ALT  Lab Results   Component Value Date    URICACID 3.9 08/15/2023       A/P:   FWB-r.  Category 1 fetal heart rate tracing, overall reassuring.    Admitted for induction of labor for elective IOL.  Induction course: miso x1, Velez balloon,   Pitocin per protocol. Contractions inadequate, pit currently at 10 mu/min.    Declines CD at this time  Discussed plan of care with midwife and MD on call who are assuming care of pt.    Rh+, GBS negative  Epidural prn    Allison H. Canavan, MD, FACOG  Obstetrics and Gynecology  [facility]  Office: [phone]

## 2023-08-17 PROBLEM — Z98.891 STATUS POST PRIMARY LOW TRANSVERSE CESAREAN SECTION: Status: ACTIVE | Noted: 2023-08-17

## 2023-08-17 PROBLEM — Z34.90 TERM PREGNANCY: Status: RESOLVED | Noted: 2023-08-15 | Resolved: 2023-08-17

## 2023-08-17 LAB
HCT VFR BLD AUTO: 28.5 % (ref 34–46.6)
HGB BLD-MCNC: 9.2 G/DL (ref 12–15.9)

## 2023-08-17 PROCEDURE — 25010000002 KETOROLAC TROMETHAMINE PER 15 MG: Performed by: OBSTETRICS & GYNECOLOGY

## 2023-08-17 PROCEDURE — 85014 HEMATOCRIT: CPT | Performed by: OBSTETRICS & GYNECOLOGY

## 2023-08-17 PROCEDURE — 25010000002 KETOROLAC TROMETHAMINE PER 15 MG: Performed by: ADVANCED PRACTICE MIDWIFE

## 2023-08-17 PROCEDURE — 85018 HEMOGLOBIN: CPT | Performed by: OBSTETRICS & GYNECOLOGY

## 2023-08-17 RX ORDER — ONDANSETRON 4 MG/1
4 TABLET, FILM COATED ORAL EVERY 6 HOURS PRN
Status: DISCONTINUED | OUTPATIENT
Start: 2023-08-17 | End: 2023-08-19 | Stop reason: HOSPADM

## 2023-08-17 RX ORDER — MISOPROSTOL 200 UG/1
800 TABLET ORAL AS NEEDED
Status: DISCONTINUED | OUTPATIENT
Start: 2023-08-17 | End: 2023-08-17 | Stop reason: SDUPTHER

## 2023-08-17 RX ORDER — OXYCODONE HYDROCHLORIDE AND ACETAMINOPHEN 5; 325 MG/1; MG/1
1 TABLET ORAL EVERY 4 HOURS PRN
Status: DISCONTINUED | OUTPATIENT
Start: 2023-08-17 | End: 2023-08-19 | Stop reason: HOSPADM

## 2023-08-17 RX ORDER — LIDOCAINE HYDROCHLORIDE AND EPINEPHRINE 20; 5 MG/ML; UG/ML
INJECTION, SOLUTION EPIDURAL; INFILTRATION; INTRACAUDAL; PERINEURAL AS NEEDED
Status: DISCONTINUED | OUTPATIENT
Start: 2023-08-16 | End: 2023-08-17 | Stop reason: SURG

## 2023-08-17 RX ORDER — PRENATAL VIT/IRON FUM/FOLIC AC 27MG-0.8MG
1 TABLET ORAL DAILY
Status: DISCONTINUED | OUTPATIENT
Start: 2023-08-17 | End: 2023-08-18

## 2023-08-17 RX ORDER — METHYLERGONOVINE MALEATE 0.2 MG/ML
200 INJECTION INTRAVENOUS ONCE AS NEEDED
Status: DISCONTINUED | OUTPATIENT
Start: 2023-08-17 | End: 2023-08-19 | Stop reason: HOSPADM

## 2023-08-17 RX ORDER — SODIUM CHLORIDE 0.9 % (FLUSH) 0.9 %
3 SYRINGE (ML) INJECTION EVERY 12 HOURS SCHEDULED
Status: DISCONTINUED | OUTPATIENT
Start: 2023-08-17 | End: 2023-08-19 | Stop reason: HOSPADM

## 2023-08-17 RX ORDER — ALUMINA, MAGNESIA, AND SIMETHICONE 2400; 2400; 240 MG/30ML; MG/30ML; MG/30ML
15 SUSPENSION ORAL EVERY 4 HOURS PRN
Status: DISCONTINUED | OUTPATIENT
Start: 2023-08-17 | End: 2023-08-19 | Stop reason: HOSPADM

## 2023-08-17 RX ORDER — POLYETHYLENE GLYCOL 3350 17 G/17G
17 POWDER, FOR SOLUTION ORAL DAILY
Status: DISCONTINUED | OUTPATIENT
Start: 2023-08-18 | End: 2023-08-17

## 2023-08-17 RX ORDER — ACETAMINOPHEN 500 MG
1000 TABLET ORAL EVERY 6 HOURS
Status: COMPLETED | OUTPATIENT
Start: 2023-08-17 | End: 2023-08-17

## 2023-08-17 RX ORDER — MISOPROSTOL 200 UG/1
800 TABLET ORAL AS NEEDED
Status: DISCONTINUED | OUTPATIENT
Start: 2023-08-17 | End: 2023-08-19 | Stop reason: HOSPADM

## 2023-08-17 RX ORDER — ONDANSETRON 4 MG/1
4 TABLET, FILM COATED ORAL EVERY 6 HOURS PRN
Status: DISCONTINUED | OUTPATIENT
Start: 2023-08-17 | End: 2023-08-17 | Stop reason: SDUPTHER

## 2023-08-17 RX ORDER — DOCUSATE SODIUM 100 MG/1
100 CAPSULE, LIQUID FILLED ORAL 2 TIMES DAILY
Status: DISCONTINUED | OUTPATIENT
Start: 2023-08-17 | End: 2023-08-19 | Stop reason: HOSPADM

## 2023-08-17 RX ORDER — PROMETHAZINE HYDROCHLORIDE 12.5 MG/1
12.5 SUPPOSITORY RECTAL EVERY 6 HOURS PRN
Status: DISCONTINUED | OUTPATIENT
Start: 2023-08-17 | End: 2023-08-19 | Stop reason: HOSPADM

## 2023-08-17 RX ORDER — HYDROCORTISONE 25 MG/G
1 CREAM TOPICAL AS NEEDED
Status: DISCONTINUED | OUTPATIENT
Start: 2023-08-17 | End: 2023-08-19 | Stop reason: HOSPADM

## 2023-08-17 RX ORDER — IBUPROFEN 600 MG/1
600 TABLET ORAL EVERY 6 HOURS PRN
Status: DISCONTINUED | OUTPATIENT
Start: 2023-08-17 | End: 2023-08-17 | Stop reason: SDUPTHER

## 2023-08-17 RX ORDER — OXYCODONE HYDROCHLORIDE 5 MG/1
5 TABLET ORAL EVERY 4 HOURS PRN
Status: DISCONTINUED | OUTPATIENT
Start: 2023-08-17 | End: 2023-08-19 | Stop reason: HOSPADM

## 2023-08-17 RX ORDER — ACETAMINOPHEN 325 MG/1
650 TABLET ORAL EVERY 4 HOURS PRN
Status: DISCONTINUED | OUTPATIENT
Start: 2023-08-17 | End: 2023-08-17 | Stop reason: SDUPTHER

## 2023-08-17 RX ORDER — DOCUSATE SODIUM 100 MG/1
100 CAPSULE, LIQUID FILLED ORAL 2 TIMES DAILY PRN
Status: DISCONTINUED | OUTPATIENT
Start: 2023-08-17 | End: 2023-08-17

## 2023-08-17 RX ORDER — CARBOPROST TROMETHAMINE 250 UG/ML
250 INJECTION, SOLUTION INTRAMUSCULAR AS NEEDED
Status: DISCONTINUED | OUTPATIENT
Start: 2023-08-17 | End: 2023-08-17 | Stop reason: SDUPTHER

## 2023-08-17 RX ORDER — CARBOPROST TROMETHAMINE 250 UG/ML
250 INJECTION, SOLUTION INTRAMUSCULAR AS NEEDED
Status: DISCONTINUED | OUTPATIENT
Start: 2023-08-17 | End: 2023-08-19 | Stop reason: HOSPADM

## 2023-08-17 RX ORDER — ONDANSETRON 2 MG/ML
4 INJECTION INTRAMUSCULAR; INTRAVENOUS EVERY 6 HOURS PRN
Status: DISCONTINUED | OUTPATIENT
Start: 2023-08-17 | End: 2023-08-19 | Stop reason: HOSPADM

## 2023-08-17 RX ORDER — OXYCODONE HYDROCHLORIDE 10 MG/1
10 TABLET ORAL EVERY 4 HOURS PRN
Status: DISCONTINUED | OUTPATIENT
Start: 2023-08-17 | End: 2023-08-19 | Stop reason: HOSPADM

## 2023-08-17 RX ORDER — SODIUM CHLORIDE 9 MG/ML
40 INJECTION, SOLUTION INTRAVENOUS AS NEEDED
Status: DISCONTINUED | OUTPATIENT
Start: 2023-08-17 | End: 2023-08-19 | Stop reason: HOSPADM

## 2023-08-17 RX ORDER — SIMETHICONE 80 MG
80 TABLET,CHEWABLE ORAL 4 TIMES DAILY PRN
Status: DISCONTINUED | OUTPATIENT
Start: 2023-08-17 | End: 2023-08-19 | Stop reason: HOSPADM

## 2023-08-17 RX ORDER — CALCIUM CARBONATE 500 MG/1
1 TABLET, CHEWABLE ORAL EVERY 4 HOURS PRN
Status: DISCONTINUED | OUTPATIENT
Start: 2023-08-17 | End: 2023-08-19 | Stop reason: HOSPADM

## 2023-08-17 RX ORDER — FERROUS SULFATE 325(65) MG
325 TABLET ORAL 2 TIMES DAILY WITH MEALS
Status: DISCONTINUED | OUTPATIENT
Start: 2023-08-17 | End: 2023-08-18 | Stop reason: ALTCHOICE

## 2023-08-17 RX ORDER — IBUPROFEN 600 MG/1
600 TABLET ORAL EVERY 6 HOURS
Status: DISCONTINUED | OUTPATIENT
Start: 2023-08-18 | End: 2023-08-19 | Stop reason: HOSPADM

## 2023-08-17 RX ORDER — ONDANSETRON 2 MG/ML
4 INJECTION INTRAMUSCULAR; INTRAVENOUS EVERY 6 HOURS PRN
Status: DISCONTINUED | OUTPATIENT
Start: 2023-08-17 | End: 2023-08-17 | Stop reason: SDUPTHER

## 2023-08-17 RX ORDER — OXYCODONE AND ACETAMINOPHEN 10; 325 MG/1; MG/1
2 TABLET ORAL EVERY 4 HOURS PRN
Status: DISCONTINUED | OUTPATIENT
Start: 2023-08-17 | End: 2023-08-19 | Stop reason: HOSPADM

## 2023-08-17 RX ORDER — HYDROXYZINE HYDROCHLORIDE 25 MG/1
50 TABLET, FILM COATED ORAL EVERY 6 HOURS PRN
Status: DISCONTINUED | OUTPATIENT
Start: 2023-08-17 | End: 2023-08-19 | Stop reason: HOSPADM

## 2023-08-17 RX ORDER — PROMETHAZINE HYDROCHLORIDE 25 MG/1
25 TABLET ORAL EVERY 6 HOURS PRN
Status: DISCONTINUED | OUTPATIENT
Start: 2023-08-17 | End: 2023-08-19 | Stop reason: HOSPADM

## 2023-08-17 RX ORDER — METHYLERGONOVINE MALEATE 0.2 MG/ML
200 INJECTION INTRAVENOUS AS NEEDED
Status: DISCONTINUED | OUTPATIENT
Start: 2023-08-17 | End: 2023-08-19 | Stop reason: HOSPADM

## 2023-08-17 RX ORDER — ACETAMINOPHEN 325 MG/1
650 TABLET ORAL EVERY 6 HOURS
Status: DISCONTINUED | OUTPATIENT
Start: 2023-08-18 | End: 2023-08-19 | Stop reason: HOSPADM

## 2023-08-17 RX ORDER — PROMETHAZINE HYDROCHLORIDE 12.5 MG/1
12.5 TABLET ORAL EVERY 4 HOURS PRN
Status: DISCONTINUED | OUTPATIENT
Start: 2023-08-17 | End: 2023-08-17 | Stop reason: SDUPTHER

## 2023-08-17 RX ORDER — POLYETHYLENE GLYCOL 3350 17 G/17G
17 POWDER, FOR SOLUTION ORAL DAILY PRN
Status: DISCONTINUED | OUTPATIENT
Start: 2023-08-17 | End: 2023-08-19 | Stop reason: HOSPADM

## 2023-08-17 RX ORDER — KETOROLAC TROMETHAMINE 15 MG/ML
15 INJECTION, SOLUTION INTRAMUSCULAR; INTRAVENOUS EVERY 6 HOURS
Status: COMPLETED | OUTPATIENT
Start: 2023-08-17 | End: 2023-08-18

## 2023-08-17 RX ORDER — NALBUPHINE HYDROCHLORIDE 10 MG/ML
3 INJECTION, SOLUTION INTRAMUSCULAR; INTRAVENOUS; SUBCUTANEOUS EVERY 4 HOURS PRN
Status: ACTIVE | OUTPATIENT
Start: 2023-08-17 | End: 2023-08-18

## 2023-08-17 RX ORDER — SODIUM CHLORIDE 0.9 % (FLUSH) 0.9 %
3-10 SYRINGE (ML) INJECTION AS NEEDED
Status: DISCONTINUED | OUTPATIENT
Start: 2023-08-17 | End: 2023-08-19 | Stop reason: HOSPADM

## 2023-08-17 RX ORDER — OXYTOCIN 10 [USP'U]/ML
10 INJECTION, SOLUTION INTRAMUSCULAR; INTRAVENOUS ONCE
Status: DISCONTINUED | OUTPATIENT
Start: 2023-08-17 | End: 2023-08-19 | Stop reason: HOSPADM

## 2023-08-17 RX ADMIN — ACETAMINOPHEN 1000 MG: 500 TABLET ORAL at 03:55

## 2023-08-17 RX ADMIN — CALCIUM CARBONATE (ANTACID) CHEW TAB 500 MG 1 TABLET: 500 CHEW TAB at 02:28

## 2023-08-17 RX ADMIN — KETOROLAC TROMETHAMINE 15 MG: 15 INJECTION, SOLUTION INTRAMUSCULAR; INTRAVENOUS at 12:06

## 2023-08-17 RX ADMIN — KETOROLAC TROMETHAMINE 30 MG: 30 INJECTION, SOLUTION INTRAMUSCULAR; INTRAVENOUS at 00:44

## 2023-08-17 RX ADMIN — ACETAMINOPHEN 1000 MG: 500 TABLET ORAL at 22:13

## 2023-08-17 RX ADMIN — ACETAMINOPHEN 1000 MG: 500 TABLET ORAL at 09:13

## 2023-08-17 RX ADMIN — POLYETHYLENE GLYCOL 3350 17 G: 17 POWDER, FOR SOLUTION ORAL at 22:13

## 2023-08-17 RX ADMIN — KETOROLAC TROMETHAMINE 15 MG: 15 INJECTION, SOLUTION INTRAMUSCULAR; INTRAVENOUS at 06:40

## 2023-08-17 RX ADMIN — ACETAMINOPHEN 1000 MG: 500 TABLET ORAL at 15:07

## 2023-08-17 RX ADMIN — PRENATAL VITAMINS-IRON FUMARATE 27 MG IRON-FOLIC ACID 0.8 MG TABLET 1 TABLET: at 09:13

## 2023-08-17 RX ADMIN — SIMETHICONE 80 MG: 80 TABLET, CHEWABLE ORAL at 09:13

## 2023-08-17 RX ADMIN — Medication 1 APPLICATION: at 02:28

## 2023-08-17 RX ADMIN — KETOROLAC TROMETHAMINE 15 MG: 15 INJECTION, SOLUTION INTRAMUSCULAR; INTRAVENOUS at 18:04

## 2023-08-17 NOTE — ANESTHESIA POSTPROCEDURE EVALUATION
Patient: Debra Palacios    Procedure Summary       Date: 23 Room / Location: [facility] LABOR DELIVERY   [facility]OR DELIVERY    Anesthesia Start: 432 Anesthesia Stop:     Procedure:  SECTION PRIMARY (Abdomen) Diagnosis:     Surgeons: Vika Roman MD Provider: Sukhwinder Wood DO    Anesthesia Type: epidural ASA Status: 2 - Emergent            Anesthesia Type: epidural    Vitals  Vitals Value Taken Time   /62 23 0702   Temp 99 øF (37.2 øC) 23 0702   Pulse 81 23 0702   Resp 16 23 0702   SpO2 95 % 23 0046   Vitals shown include unvalidated device data.        Post Anesthesia Care and Evaluation    Patient location during evaluation: bedside  Patient participation: complete - patient participated  Level of consciousness: awake and alert  Pain management: adequate    Airway patency: patent  Anesthetic complications: No anesthetic complications    Cardiovascular status: acceptable  Respiratory status: acceptable  Hydration status: acceptable  Post Neuraxial Block status: Motor and sensory function returned to baseline and No signs or symptoms of PDPH

## 2023-08-17 NOTE — ANESTHESIA POSTPROCEDURE EVALUATION
Patient: Debra Palacios    Procedure Summary       Date: 23 Room / Location: [facility] LABOR DELIVERY   [facility]OR DELIVERY    Anesthesia Start: 432 Anesthesia Stop:     Procedure:  SECTION PRIMARY (Abdomen) Diagnosis:     Surgeons: Vika Roman MD Provider: Sukhwinder Wood DO    Anesthesia Type: epidural ASA Status: 2 - Emergent            Anesthesia Type: epidural    Vitals  Vitals Value Taken Time   BP 84/52 230   Temp 98.7 øF 23   Pulse 97 23   Resp 16 23   SpO2 96 % 23   Vitals shown include unvalidated device data.        Post Anesthesia Care and Evaluation    Patient location during evaluation: bedside  Patient participation: complete - patient participated  Level of consciousness: awake  Pain score: 0  Pain management: satisfactory to patient    Airway patency: patent  Anesthetic complications: No anesthetic complications  PONV Status: none  Cardiovascular status: acceptable and hemodynamically stable  Respiratory status: acceptable  Hydration status: acceptable

## 2023-08-17 NOTE — PROGRESS NOTES
Andrea  Obstetric Progress Note    Subjective     Patient:    Debra is starting to feel some of her contractions. She remains inadequate with her labor pattern despite pitocin break and restart. Her axillary temp is up to 100.4. Her FHR tracing remains reactive.     Objective     Vital Signs Range for the last 24 hours  Temp:  [97.5 øF (36.4 øC)-100.4 øF (38 øC)] 100.4 øF (38 øC)   Temp src: Axillary   BP: ()/(42-78) 111/54   Heart Rate:  [] 95   Resp:  [16-18] 16                       Intake/Output this shift:    No intake/output data recorded.        Presentation: cephalic   Cervix: Exam by: Method: sterile exam per CNM (anna)   Dilation:  4-5   Effacement: Cervical Effacement: 90%   Station:  -2         Fetal Heart Rate Assessment   Method: Fetal HR Assessment Method: fetal spiral electrode   Beats/min: Fetal HR (beats/min): 145   Baseline: Fetal HR Baseline: normal range   Varibility: Fetal HR Variability: moderate (amplitude range 6 to 25 bpm)   Accels: Fetal HR Accelerations: greater than/equal to 15 bpm, lasting at least 15 seconds   Decels: Fetal HR Decelerations: absent   Tracing Category:       Uterine Assessment   Method: Method: palpation   Frequency (min): Contraction Frequency (Minutes): 2-3   Ctx Count in 10 min:     Duration:     Intensity: Contraction Intensity: mild by palpation   Intensity by IUPC: Contraction Intensity (mmHg) by IUPC: 55-60   Resting Tone: Uterine Resting Tone: soft by palpation   Resting Tone by IUPC: Uterine Resting Tone (mmHg) by IUPC: 25-30   [place] Units:         Assessment & Plan       Term pregnancy        Assessment:  1.  Intrauterine pregnancy at 39w4d weeks gestation with reactive fetal status.    2.  Failed IOL. No cervical change past 4-5cm. Temp starting to rise  3.  Meconium stained amniotic fluid  4. Epidural in place for pain mangement      Plan:  1. Recommended C/S for delivery. Reviewed r/b/c/a. Discussed procedure and recovery.  All questions answered to the best of my ability. Pt agrees with recommendation.  2. Dr Roman consulted.   3. Pt being prepped for surgery and orders placed.         Michelle Mota CNM  8/16/2023  21:10 EDT     good balance

## 2023-08-17 NOTE — PROGRESS NOTES
2023    Name:Debra Palacios    MR#:[id number]     PROGRESS NOTE:  Post-Op 1 S/P        Subjective   38 y.o. yo Female  s/p CS at 39w4d doing well. Pain well controlled, lochia appropriate. She is breastfeeding.       Status post primary low transverse  section    Postpartum anemia        Objective    Vitals  Temp:  Temp:  [98.1 øF (36.7 øC)-100.4 øF (38 øC)] 99 øF (37.2 øC)  Temp src: Oral  BP:  BP: ()/(42-78) 112/62  Pulse:  Heart Rate:  [] 81  RR:   Resp:  [16-18] 16    General Awake, alert, no distress  Abdomen Soft, non-distended, fundus firm, below umbilicus, appropriately tender  Incision  Intact, no erythema or exudate  Extremities Calves NT bilaterally     I/O last 3 completed shifts:  In: 1000 [I.V.:1000]  Out: 2767 [Urine:1050; Blood:1717]    Lab Results   Component Value Date    WBC 8.74 08/15/2023    HGB 9.2 (L) 2023    HCT 28.5 (L) 2023    MCV 91.0 08/15/2023     08/15/2023    URICACID 3.9 08/15/2023    AST 27 08/15/2023    ALT 16 08/15/2023    HEPBSAG Non-Reactive 2023     Results from last 7 days   Lab Units 08/15/23  0709   ABO TYPING  A   RH TYPING  Positive   ANTIBODY SCREEN  Negative       Infant:   female       Assessment   1.  POD 1 from  Section   2.  PP anemia 2/2 acute blood loss.     Plan: Doing well.  Continue slow Fe BID.  D/C iv, advance diet, may shower.          Lane Hernandez CNM  2023 08:52 EDT

## 2023-08-17 NOTE — LACTATION NOTE
08/17/23 1130   Maternal Information   Date of Referral 08/17/23   Person Making Referral lactation consultant   Maternal Reason for Referral no prior breastfeeding experience   Maternal Assessment   Breast Size Issue none   Breast Shape Bilateral:;round   Breast Density Bilateral:;soft   Nipples Bilateral:;everted   Left Nipple Symptoms intact;nontender   Right Nipple Symptoms intact;nontender   Maternal Infant Feeding   Maternal Emotional State independent;receptive   Infant Positioning cross-cradle  (left)   Signs of Milk Transfer deep jaw excursions noted;audible swallow  (excellent feeding at breast)   Pain with Feeding no   Comfort Measures Before/During Feeding suction broken using finger;maternal position adjusted;latch adjusted;infant position adjusted   Latch Assistance minimal assistance   Support Person Involvement actively supporting mother   Milk Expression/Equipment   Breast Pump Type double electric, personal  (Spectra in the room.)   Breast Pumping   Breast Pumping Interventions   (Encouraged to pump for short or missed feeding.)

## 2023-08-17 NOTE — OP NOTE
[facility]   Section Operative Note    Pre-Operative Dx:   1.  IUP at Gestational Age: 39w4d  weeks    2. Arrest of Dilation  3. Obesity, BMI 41   Failure to Progress    Postoperative dx:    1.  Same     Procedure: Procedure(s):   SECTION PRIMARY, mod 22 for obesity complicating all aspects of care   Surgeon/Assistant: Surgeon(s):  Vika Roman MD         Anesthesia:  Anesthesiologist: Epidural  Anesthesiologist: Sukhwinder Wood DO; Jos Christianson MD         QBL:  670 mL        IV Fluids: 1000 mls.   UOP: Not recorded at time of note mls.    I/O this shift:  In: -   Out: 670 [Blood:670]   Antibiotics: cefazolin (Ancef) and Azithromycin     Infant:           Gender: female  infant    Weight: 9 #2    Apgars: 8  @ 1 minute /     9  @ 5 minutes    Cord gases: Venous:  No results found for: PHCVEN     Arterial:  No results found for: PHCART     Indication for C/Section:  Failure to Progress   arrest of dilation        Priority for C/Section:  routine routine     Procedure Details:   The patient was taken to the operating room after risks and benefits have been reviewed. The consent was reviewed and had been signed. Time Out was performed. She was prepped and draped in the normal sterile fashion in the dorsal supine position and a leftward tilt.  A Pfannenstiel skin incision made 2 cm above the pubic symphysis and carried down to the underlying layer of fascia with the scalpel.  The fascia was nicked in the midline and the incision was extended laterally with Solis scissors.  The anterior aspect of the incision was grasped with Kocher clamps x2 and elevated and the underlying rectus muscles were dissected off sharply and bluntly.  The inferior aspect of the incision was treated similarly.  The peritoneum was identified and entered bluntly the incision was extended with lateral traction.  A bladder blade was inserted.  The lower uterine segment was identified and incised in a transverse fashion  with the scalpel.  The uterine cavity was entered bluntly and the incision was extended with cephalocaudad traction.  The fetus was then delivered atraumatically.  Cord was clamped and cut after 60 second delay and the infant was handed off to DRT staff for evaluation.  Cord blood and cord gases were obtained.  The placenta was delivered via uterine massage.  The uterus was then exteriorized and cleared of all clot and debris with clean laparotomy sponges.  The hysterotomy was closed in a single layer with a #1 chromic in a running locking fashion. An imbricating stitch of the same suture was used for additional support. Hemostasis was noted.  The posterior cul-de-sac was irrigated and aspirated.  The hysterotomy was again inspected and noted to be hemostatic.  The uterus was returned to the abdomen.  The paracolic gutters were irrigated and aspirated.  Hysterotomy was inspected for third time and noted to be hemostatic.  The peritoneum was closed with 2-0 chromic in a running fashion.  The rectus muscles underlying the fascia were made hemostatic with Bovie electrocautery.  The fascia was then closed with 1 Vicryl in a running stitch.  The subcutaneous tissues were irrigated aspirated and made hemostatic with Bovie electrocautery.  The subcutaneous tissues were reapproximated with 3-0 Vicryl in 2 layers and the skin was closed with 4-0 Monocryl in a subcuticular fashion and sealed with Dermabond.  All counts were correct and the patient was taken to the recovery room in stable condition.         Complications:   None     Specimens: none     Disposition:   Mother to Mother Baby/Postpartum  in stable condition currently.   Baby to NBN  in stable condition currently.       Vika Roman MD  8/16/2023  22:26 EDT

## 2023-08-18 LAB
BASOPHILS # BLD AUTO: 0.03 10*3/MM3 (ref 0–0.2)
BASOPHILS NFR BLD AUTO: 0.3 % (ref 0–1.5)
DEPRECATED RDW RBC AUTO: 52.5 FL (ref 37–54)
EOSINOPHIL # BLD AUTO: 0.08 10*3/MM3 (ref 0–0.4)
EOSINOPHIL NFR BLD AUTO: 0.7 % (ref 0.3–6.2)
ERYTHROCYTE [DISTWIDTH] IN BLOOD BY AUTOMATED COUNT: 15.2 % (ref 12.3–15.4)
HCT VFR BLD AUTO: 24.8 % (ref 34–46.6)
HGB BLD-MCNC: 7.9 G/DL (ref 12–15.9)
IMM GRANULOCYTES # BLD AUTO: 0.15 10*3/MM3 (ref 0–0.05)
IMM GRANULOCYTES NFR BLD AUTO: 1.4 % (ref 0–0.5)
LYMPHOCYTES # BLD AUTO: 1.53 10*3/MM3 (ref 0.7–3.1)
LYMPHOCYTES NFR BLD AUTO: 14.1 % (ref 19.6–45.3)
MCH RBC QN AUTO: 30.2 PG (ref 26.6–33)
MCHC RBC AUTO-ENTMCNC: 31.9 G/DL (ref 31.5–35.7)
MCV RBC AUTO: 94.7 FL (ref 79–97)
MONOCYTES # BLD AUTO: 0.75 10*3/MM3 (ref 0.1–0.9)
MONOCYTES NFR BLD AUTO: 6.9 % (ref 5–12)
NEUTROPHILS NFR BLD AUTO: 76.6 % (ref 42.7–76)
NEUTROPHILS NFR BLD AUTO: 8.3 10*3/MM3 (ref 1.7–7)
NRBC BLD AUTO-RTO: 0 /100 WBC (ref 0–0.2)
PLATELET # BLD AUTO: 204 10*3/MM3 (ref 140–450)
PMV BLD AUTO: 10.5 FL (ref 6–12)
RBC # BLD AUTO: 2.62 10*6/MM3 (ref 3.77–5.28)
WBC NRBC COR # BLD: 10.84 10*3/MM3 (ref 3.4–10.8)

## 2023-08-18 PROCEDURE — 25010000002 KETOROLAC TROMETHAMINE PER 15 MG: Performed by: OBSTETRICS & GYNECOLOGY

## 2023-08-18 PROCEDURE — P9040 RBC LEUKOREDUCED IRRADIATED: HCPCS

## 2023-08-18 PROCEDURE — P9016 RBC LEUKOCYTES REDUCED: HCPCS

## 2023-08-18 PROCEDURE — 86923 COMPATIBILITY TEST ELECTRIC: CPT

## 2023-08-18 PROCEDURE — 85025 COMPLETE CBC W/AUTO DIFF WBC: CPT | Performed by: OBSTETRICS & GYNECOLOGY

## 2023-08-18 PROCEDURE — 86900 BLOOD TYPING SEROLOGIC ABO: CPT

## 2023-08-18 PROCEDURE — 36430 TRANSFUSION BLD/BLD COMPNT: CPT

## 2023-08-18 RX ORDER — PRENATAL VIT/IRON FUM/FOLIC AC 27MG-0.8MG
1 TABLET ORAL DAILY
Status: DISCONTINUED | OUTPATIENT
Start: 2023-08-19 | End: 2023-08-19 | Stop reason: HOSPADM

## 2023-08-18 RX ADMIN — Medication 1 APPLICATION: at 13:13

## 2023-08-18 RX ADMIN — ACETAMINOPHEN 650 MG: 325 TABLET ORAL at 22:44

## 2023-08-18 RX ADMIN — IBUPROFEN 600 MG: 600 TABLET ORAL at 13:13

## 2023-08-18 RX ADMIN — ACETAMINOPHEN 650 MG: 325 TABLET ORAL at 17:21

## 2023-08-18 RX ADMIN — IBUPROFEN 600 MG: 600 TABLET ORAL at 06:19

## 2023-08-18 RX ADMIN — POLYETHYLENE GLYCOL 3350 17 G: 17 POWDER, FOR SOLUTION ORAL at 09:30

## 2023-08-18 RX ADMIN — ACETAMINOPHEN 650 MG: 325 TABLET ORAL at 03:48

## 2023-08-18 RX ADMIN — IBUPROFEN 600 MG: 600 TABLET ORAL at 18:52

## 2023-08-18 RX ADMIN — ACETAMINOPHEN 650 MG: 325 TABLET ORAL at 09:18

## 2023-08-18 RX ADMIN — KETOROLAC TROMETHAMINE 15 MG: 15 INJECTION, SOLUTION INTRAMUSCULAR; INTRAVENOUS at 00:40

## 2023-08-18 RX ADMIN — SIMETHICONE 80 MG: 80 TABLET, CHEWABLE ORAL at 13:21

## 2023-08-18 NOTE — LACTATION NOTE
08/18/23 1030   Maternal Information   Date of Referral 08/18/23   Person Making Referral lactation consultant   Maternal Reason for Referral latch difficulty   Infant Reason for Referral   (Blisters to tip left nipple & compression stripe to right.  Assisted w latching infant more deeply with a nipple to nose positioning in FB hold.  Nipple round & everted p infant releases from feeding.  Mom states latch was more comfortable than previous.)   Maternal Assessment   Breast Size Issue none   Breast Shape Bilateral:;round   Breast Density Bilateral:;soft   Nipples Bilateral:;short   Left Nipple Symptoms blisters;tender   Right Nipple Symptoms abraded;tender  (compression stripe)   Maternal Infant Feeding   Maternal Emotional State receptive;relaxed   Infant Positioning clutch/football   Signs of Milk Transfer audible swallow;deep jaw excursions noted   Pain with Feeding no   Comfort Measures Before/During Feeding infant position adjusted;latch adjusted;suction broken using finger;maternal position adjusted   Nipple Shape After Feeding, Left Breast round;symmetrical;appropriately projected   Latch Assistance minimal assistance;verbal guidance offered   Support Person Involvement actively supporting mother;verbally supports mother        Declined

## 2023-08-18 NOTE — LACTATION NOTE
Baby currently in nsy.  Consult put in for patient by night shift nurse.  Mom states infant was fussy and nursing all night last night, concerned infant is not getting what she needs.  Educated parents on second night syndrome and normal eating patterns for a .  No other concerns from parents at this time.  Encouraged mom to call if she needs assistance.

## 2023-08-18 NOTE — LACTATION NOTE
Mom receiving blood.  Spectra at bedside.  Encouraged mom to pump after nursing during daytime feeds. Directed mom to spectra video.  Syringes given.

## 2023-08-18 NOTE — PROGRESS NOTES
2023    Name:Debra Palacios    MR#:[id number]     PROGRESS NOTE:  Post-Op 2 S/P        Subjective   38 y.o. yo Female  s/p CS at 39w4d doing well. Pain well controlled, lochia appropriate.  Feeling fatigue, weak.  Complains of mild soreness abdominally.        Status post primary low transverse  section    Postpartum anemia        Objective    Vitals  Temp:  Temp:  [97.7 øF (36.5 øC)-98.8 øF (37.1 øC)] 98.6 øF (37 øC)  Temp src: Oral  BP:  BP: (109-125)/(55-69) 113/59  Pulse:  Heart Rate:  [74-88] 80  RR:   Resp:  [16-18] 16    General Awake, alert, no distress  Abdomen Soft, nondistended, fundus firm, below umbilicus, appropriately tender  Incision  Intact, no erythema or exudate  Extremities Calves NT bilaterally     I/O last 3 completed shifts:  In: 1000 [I.V.:1000]  Out: 3292 [Urine:1575; Blood:1717]    Lab Results   Component Value Date    WBC 10.84 (H) 2023    HGB 7.9 (L) 2023    HCT 24.8 (L) 2023    MCV 94.7 2023     2023    URICACID 3.9 08/15/2023    AST 27 08/15/2023    ALT 16 08/15/2023    HEPBSAG Non-Reactive 2023     Results from last 7 days   Lab Units 08/15/23  0709   ABO TYPING  A   RH TYPING  Positive   ANTIBODY SCREEN  Negative       Infant:   female       Assessment   1.  POD 2 from  Section  Postpartum anemia, symptomatic.      Plan: Doing well.    Discontinue IV, advance diet, may shower.  Blood transfusion today, 2 u prbc ordered, discussed r/b/a of administration, pt counseled, voices understanding.            Allison Canavan, MD  2023 10:11 EDT

## 2023-08-19 VITALS
SYSTOLIC BLOOD PRESSURE: 124 MMHG | RESPIRATION RATE: 18 BRPM | OXYGEN SATURATION: 96 % | WEIGHT: 276 LBS | HEIGHT: 69 IN | TEMPERATURE: 97.7 F | HEART RATE: 67 BPM | DIASTOLIC BLOOD PRESSURE: 67 MMHG | BODY MASS INDEX: 40.88 KG/M2

## 2023-08-19 LAB
BASOPHILS # BLD AUTO: 0.03 10*3/MM3 (ref 0–0.2)
BASOPHILS NFR BLD AUTO: 0.3 % (ref 0–1.5)
BH BB BLOOD EXPIRATION DATE: NORMAL
BH BB BLOOD EXPIRATION DATE: NORMAL
BH BB BLOOD TYPE BARCODE: 6200
BH BB BLOOD TYPE BARCODE: 6200
BH BB DISPENSE STATUS: NORMAL
BH BB DISPENSE STATUS: NORMAL
BH BB PRODUCT CODE: NORMAL
BH BB PRODUCT CODE: NORMAL
BH BB UNIT NUMBER: NORMAL
BH BB UNIT NUMBER: NORMAL
CROSSMATCH INTERPRETATION: NORMAL
CROSSMATCH INTERPRETATION: NORMAL
DEPRECATED RDW RBC AUTO: 52.8 FL (ref 37–54)
EOSINOPHIL # BLD AUTO: 0.15 10*3/MM3 (ref 0–0.4)
EOSINOPHIL NFR BLD AUTO: 1.6 % (ref 0.3–6.2)
ERYTHROCYTE [DISTWIDTH] IN BLOOD BY AUTOMATED COUNT: 15.7 % (ref 12.3–15.4)
HCT VFR BLD AUTO: 28.2 % (ref 34–46.6)
HGB BLD-MCNC: 9.2 G/DL (ref 12–15.9)
IMM GRANULOCYTES # BLD AUTO: 0.32 10*3/MM3 (ref 0–0.05)
IMM GRANULOCYTES NFR BLD AUTO: 3.4 % (ref 0–0.5)
LYMPHOCYTES # BLD AUTO: 1.53 10*3/MM3 (ref 0.7–3.1)
LYMPHOCYTES NFR BLD AUTO: 16.4 % (ref 19.6–45.3)
MCH RBC QN AUTO: 30.4 PG (ref 26.6–33)
MCHC RBC AUTO-ENTMCNC: 32.6 G/DL (ref 31.5–35.7)
MCV RBC AUTO: 93.1 FL (ref 79–97)
MONOCYTES # BLD AUTO: 0.61 10*3/MM3 (ref 0.1–0.9)
MONOCYTES NFR BLD AUTO: 6.5 % (ref 5–12)
NEUTROPHILS NFR BLD AUTO: 6.71 10*3/MM3 (ref 1.7–7)
NEUTROPHILS NFR BLD AUTO: 71.8 % (ref 42.7–76)
NRBC BLD AUTO-RTO: 0 /100 WBC (ref 0–0.2)
PLATELET # BLD AUTO: 220 10*3/MM3 (ref 140–450)
PMV BLD AUTO: 10.1 FL (ref 6–12)
RBC # BLD AUTO: 3.03 10*6/MM3 (ref 3.77–5.28)
UNIT  ABO: NORMAL
UNIT  ABO: NORMAL
UNIT  RH: NORMAL
UNIT  RH: NORMAL
WBC NRBC COR # BLD: 9.35 10*3/MM3 (ref 3.4–10.8)

## 2023-08-19 PROCEDURE — 85025 COMPLETE CBC W/AUTO DIFF WBC: CPT | Performed by: OBSTETRICS & GYNECOLOGY

## 2023-08-19 RX ORDER — OXYCODONE HYDROCHLORIDE 5 MG/1
5 TABLET ORAL EVERY 4 HOURS PRN
Qty: 18 TABLET | Refills: 0 | Status: SHIPPED | OUTPATIENT
Start: 2023-08-19 | End: 2023-08-22

## 2023-08-19 RX ORDER — IBUPROFEN 600 MG/1
600 TABLET ORAL EVERY 6 HOURS
Qty: 60 TABLET | Refills: 1 | Status: SHIPPED | OUTPATIENT
Start: 2023-08-19

## 2023-08-19 RX ORDER — SIMETHICONE 80 MG
80 TABLET,CHEWABLE ORAL 4 TIMES DAILY PRN
Qty: 40 TABLET | Refills: 1 | Status: SHIPPED | OUTPATIENT
Start: 2023-08-19

## 2023-08-19 RX ADMIN — SIMETHICONE 80 MG: 80 TABLET, CHEWABLE ORAL at 08:13

## 2023-08-19 RX ADMIN — POLYETHYLENE GLYCOL 3350 17 G: 17 POWDER, FOR SOLUTION ORAL at 08:13

## 2023-08-19 RX ADMIN — ACETAMINOPHEN 650 MG: 325 TABLET ORAL at 10:21

## 2023-08-19 RX ADMIN — IBUPROFEN 600 MG: 600 TABLET ORAL at 01:54

## 2023-08-19 RX ADMIN — IBUPROFEN 600 MG: 600 TABLET ORAL at 12:55

## 2023-08-19 RX ADMIN — Medication 3 ML: at 08:15

## 2023-08-19 RX ADMIN — ACETAMINOPHEN 650 MG: 325 TABLET ORAL at 04:23

## 2023-08-19 RX ADMIN — IBUPROFEN 600 MG: 600 TABLET ORAL at 06:21

## 2023-08-19 RX ADMIN — Medication 1 TABLET: at 08:13

## 2023-08-19 NOTE — DISCHARGE SUMMARY
[place] [facility]   Discharge Summary      Patient: Debra Palacios      MR#:[id number]  Admission  Diagnosis: 39 weeks gestation  Discharge Diagnosis:   1. Status post primary low transverse  section        Date of Admission: 8/15/2023  Date of Discharge:  2023    Procedures:  , Low Transverse     2023    10:00 PM      Service:  Obstetrics    Hospital Course:  Patient underwent  section and remained in the hospital for 4 days.  During that time she remained afebrile and hemodynamically stable.  On the day of discharge, she was eating, ambulating and voiding without difficulty.        Labs:    Lab Results (last 24 hours)       Procedure Component Value Units Date/Time    CBC & Differential [[id number]]  (Abnormal) Collected: 23    Specimen: Blood Updated: 23    Narrative:      The following orders were created for panel order CBC & Differential.  Procedure                               Abnormality         Status                     ---------                               -----------         ------                     CBC Auto Differential[[id number]]        Abnormal            Final result                 Please view results for these tests on the individual orders.    CBC Auto Differential [[id number]]  (Abnormal) Collected: 23    Specimen: Blood Updated: 23     WBC 9.35 10*3/mm3      RBC 3.03 10*6/mm3      Hemoglobin 9.2 g/dL      Hematocrit 28.2 %      MCV 93.1 fL      MCH 30.4 pg      MCHC 32.6 g/dL      RDW 15.7 %      RDW-SD 52.8 fl      MPV 10.1 fL      Platelets 220 10*3/mm3      Neutrophil % 71.8 %      Lymphocyte % 16.4 %      Monocyte % 6.5 %      Eosinophil % 1.6 %      Basophil % 0.3 %      Immature Grans % 3.4 %      Neutrophils, Absolute 6.71 10*3/mm3      Lymphocytes, Absolute 1.53 10*3/mm3      Monocytes, Absolute 0.61 10*3/mm3      Eosinophils, Absolute 0.15 10*3/mm3      Basophils, Absolute 0.03 10*3/mm3       Immature Grans, Absolute 0.32 10*3/mm3      nRBC 0.0 /100 WBC             Discharge Medications     Discharge Medications        New Medications        Instructions Start Date   ibuprofen 600 MG tablet  Commonly known as: ADVIL,MOTRIN   600 mg, Oral, Every 6 Hours      oxyCODONE 5 MG immediate release tablet  Commonly known as: ROXICODONE   5 mg, Oral, Every 4 Hours PRN      simethicone 80 MG chewable tablet  Commonly known as: MYLICON   80 mg, Oral, 4 Times Daily PRN             Continue These Medications        Instructions Start Date   cetirizine 10 MG tablet  Commonly known as: zyrTEC   Every 24 Hours      ferrous sulfate 325 (65 FE) MG tablet   325 mg, Oral, Daily With Breakfast      magnesium citrate 1.745 GM/30ML solution solution   Oral, Once      PRENATAL 1+1 PO   Prenatal   1 po daily      sertraline 100 MG tablet  Commonly known as: ZOLOFT   sertraline 100 mg tablet   TAKE 1 TABLET BY MOUTH ONCE DAILY FOR 90 DAYS             Stop These Medications      metaxalone 800 MG tablet  Commonly known as: SKELAXIN              Discharge Disposition:  To Home    Discharge Condition:  Stable    Discharge Diet: regular    Activity at Discharge: pelvic rest    Follow-up Appointments  No future appointments.      Michelle Mota CNM  08/19/23  12:26 EDT

## 2023-08-19 NOTE — LACTATION NOTE
08/19/23 1130   Maternal Information   Date of Referral 08/19/23   Person Making Referral lactation consultant   Maternal Reason for Referral latch difficulty   Maternal Assessment   Breast Shape Bilateral:;round   Breast Density Bilateral:;soft   Nipples Bilateral:;graspable   Left Nipple Symptoms tender   Right Nipple Symptoms abraded;tender;painful  (compression stripe)   Maternal Infant Feeding   Maternal Emotional State receptive;relaxed   Infant Positioning clutch/football  (left)   Signs of Milk Transfer audible swallow;deep jaw excursions noted   Pain with Feeding yes   Pain Location nipple, left   Pain Description sharp  (pain decreased with latch adjustment)   Comfort Measures Before/During Feeding infant position adjusted;latch adjusted;maternal position adjusted   Comfort Measures Following Feeding breast cream/oil applied;air-drying encouraged;hydrogel applied   Nipple Shape After Feeding, Left Breast pinched;compression stripe  (compression stripe previously seen; slightly pinched after feeding, but less so than first time latch was broken and readjusted)   Latch Assistance minimal assistance;none needed   Support Person Involvement actively supporting mother   Milk Expression/Equipment   Breast Pump Type double electric, personal  (spectra, has used twice)   Equipment for Home Use breast pump ordered through insurance   Breast Pumping   Breast Pumping Interventions post-feed pumping encouraged  (encouraged to pump for short or missed feedings.)     Courtesy visit. Infant swaddled asleep in crib. Parents with questions on frequency of pumping and how to wake baby for feeds. MOB asking if it would be appropriate to just nurse on the right side while the sore left nipple heals. Reviewed importance of stimulation; gave her the option of pumping the left in place of feedings, or block feeding to give the sore nipple more time to heal between feedings. Encouraged MOB to work on improving deep latch as to  not add to damage.  LC demonstrated waking baby by unswaddling, removing clothes, sitting up, burping and changing diaper. OB in room to discuss discharge with MOB. FOB assisted MOB with positioning pillows for football hold; instructed to place pillows vertically behind back. MOB would like to attempt feeding on left side as it is more uncomfortable and she would like more practice before discharge. Initial latch was pinchy, demonstrated ways to deepen latch; rolling out bottom lip and compressing breast and bringing baby closer. Then demonstrated how to break latch and try again; nipple noted to be compressed. MOB then latched baby independently, LC assisted to compress breast and bring baby closer, this improved MOB's comfort. Baby nursed well for 15 minutes. MOB reports comfort throughout feeding. Nipple noted to be slightly pinched at end of feeding, but improved from first latch. Encouraged to call as needs arise.

## 2023-08-19 NOTE — PROGRESS NOTES
[facility] [facility]   PROGRESS NOTE      Subjective     Patient reports:   POD#3 Primary C/S for failed IOL. Feeling well. Her h/h was down to 7.9/24.8 yesterday and received 2 units prbcs. Reports to be feeling much better since receiving the blood. Her h/h is up to 9.2/28.2 today. She is tolerating regular diet. Voiding without difficulty. Pain controlled. Has already had a BM. VSS. Afebrile. She is ready for discharge home today.     Objective      Vitals: Vital Signs Range for the last 24 hours  Temperature: Temp:  [97.7 øF (36.5 øC)-98.2 øF (36.8 øC)] 97.7 øF (36.5 øC)   Temp Source: Temp src: Oral   BP: BP: (112-128)/(55-67) 124/67   Pulse: Heart Rate:  [67-86] 67   Respirations: Resp:  [16-20] 18   SPO2:     O2 Amount (l/min):     O2 Devices            Physical Exam    Lungs clear to auscultation bilaterally   Abdomen Soft, non-tender   Incision  healing well, no drainage, no erythema, no swelling, well approximated   Extremities Homans sign is negative, no sign of DVT     LABS:  Lab Results   Component Value Date    WBC 9.35 2023    HGB 9.2 (L) 2023    HCT 28.2 (L) 2023    MCV 93.1 2023     2023       Assessment & Plan        Status post primary low transverse  section    Postpartum anemia      Assessment:    Debra Palacios is Day 3  post-partum        Plan:  plan for discharge today.        Michelle Mota CNM  2023  12:21 EDT

## 2023-09-27 ENCOUNTER — LAB (OUTPATIENT)
Dept: LAB | Facility: HOSPITAL | Age: 38
End: 2023-09-27
Payer: COMMERCIAL

## 2023-09-27 ENCOUNTER — TRANSCRIBE ORDERS (OUTPATIENT)
Dept: LAB | Facility: HOSPITAL | Age: 38
End: 2023-09-27
Payer: COMMERCIAL

## 2023-09-27 DIAGNOSIS — Z13.32 ENCOUNTER FOR SCREENING FOR MATERNAL DEPRESSION: ICD-10-CM

## 2023-09-27 LAB
DEPRECATED RDW RBC AUTO: 43.9 FL (ref 37–54)
ERYTHROCYTE [DISTWIDTH] IN BLOOD BY AUTOMATED COUNT: 13.2 % (ref 12.3–15.4)
HCT VFR BLD AUTO: 39.2 % (ref 34–46.6)
HGB BLD-MCNC: 13.1 G/DL (ref 12–15.9)
MCH RBC QN AUTO: 29.9 PG (ref 26.6–33)
MCHC RBC AUTO-ENTMCNC: 33.4 G/DL (ref 31.5–35.7)
MCV RBC AUTO: 89.5 FL (ref 79–97)
PLATELET # BLD AUTO: 326 10*3/MM3 (ref 140–450)
PMV BLD AUTO: 9.9 FL (ref 6–12)
RBC # BLD AUTO: 4.38 10*6/MM3 (ref 3.77–5.28)
WBC NRBC COR # BLD: 6.8 10*3/MM3 (ref 3.4–10.8)

## 2023-09-27 PROCEDURE — 36415 COLL VENOUS BLD VENIPUNCTURE: CPT

## 2023-09-27 PROCEDURE — 85027 COMPLETE CBC AUTOMATED: CPT

## (undated) DEVICE — SUT GUT CHRM 1 CTX 36IN 905H

## (undated) DEVICE — GLV SURG BIOGEL LTX PF 6 1/2

## (undated) DEVICE — TBG PENCL TELESCP MEGADYNE SMOKE EVAC 10FT

## (undated) DEVICE — SUT GUT CHRM 2/0 CT1 27IN 811H

## (undated) DEVICE — COATED VICRYL  (POLYGLACTIN 910) SUTURE, VIOLET BRAIDED, STERILE, SYNTHETIC ABSORBABLE SUTURE: Brand: COATED VICRYL

## (undated) DEVICE — PK C/SECT 10

## (undated) DEVICE — APPL CHLORAPREP TINTED 26ML TEAL

## (undated) DEVICE — SOL IRR H2O BTL 1000ML STRL

## (undated) DEVICE — CLTH CLENS READYCLEANSE PERI CARE PK/5

## (undated) DEVICE — SUT MONOCRYL SZ 4 0 18IN PS1 Y682H BX/36

## (undated) DEVICE — TRAP FLD MINIVAC MEGADYNE 100ML

## (undated) DEVICE — GLV SURG BIOGEL LTX PF 6

## (undated) DEVICE — MAT PREVALON MOBL TRANSFR AIR W/PAD REPROC 39X81IN

## (undated) DEVICE — 4-PORT MANIFOLD: Brand: NEPTUNE 2

## (undated) DEVICE — TRY SPINE BLCK WHITACRE 25G 3X5IN

## (undated) DEVICE — PATIENT RETURN ELECTRODE, SINGLE-USE, CONTACT QUALITY MONITORING, ADULT, WITH 9FT CORD, FOR PATIENTS WEIGING OVER 33LBS. (15KG): Brand: MEGADYNE

## (undated) DEVICE — SUT VIC 3/0 CT1 27IN DYED J338H

## (undated) DEVICE — SOL IRR NACL 0.9PCT BT 1000ML

## (undated) DEVICE — ADHS SKIN PREMIERPRO EXOFIN TOPICAL HI/VISC .5ML